# Patient Record
Sex: FEMALE | Race: ASIAN | NOT HISPANIC OR LATINO | URBAN - METROPOLITAN AREA
[De-identification: names, ages, dates, MRNs, and addresses within clinical notes are randomized per-mention and may not be internally consistent; named-entity substitution may affect disease eponyms.]

---

## 2018-01-29 ENCOUNTER — AMBULATORY - HEALTHEAST (OUTPATIENT)
Dept: NURSING | Facility: CLINIC | Age: 28
End: 2018-01-29

## 2018-09-28 ENCOUNTER — AMBULATORY - HEALTHEAST (OUTPATIENT)
Dept: NURSING | Facility: CLINIC | Age: 28
End: 2018-09-28

## 2019-01-21 ENCOUNTER — OFFICE VISIT - HEALTHEAST (OUTPATIENT)
Dept: FAMILY MEDICINE | Facility: CLINIC | Age: 29
End: 2019-01-21

## 2019-01-21 DIAGNOSIS — Z30.46 NEXPLANON REMOVAL: ICD-10-CM

## 2019-01-21 DIAGNOSIS — Z31.9 PROCREATIVE MANAGEMENT: ICD-10-CM

## 2019-01-21 ASSESSMENT — MIFFLIN-ST. JEOR: SCORE: 1143.4

## 2019-02-05 ENCOUNTER — COMMUNICATION - HEALTHEAST (OUTPATIENT)
Dept: FAMILY MEDICINE | Facility: CLINIC | Age: 29
End: 2019-02-05

## 2019-02-05 DIAGNOSIS — Z30.016 ENCOUNTER FOR INITIAL PRESCRIPTION OF TRANSDERMAL PATCH HORMONAL CONTRACEPTIVE DEVICE: ICD-10-CM

## 2019-03-13 ENCOUNTER — COMMUNICATION - HEALTHEAST (OUTPATIENT)
Dept: FAMILY MEDICINE | Facility: CLINIC | Age: 29
End: 2019-03-13

## 2019-03-22 ENCOUNTER — PRENATAL OFFICE VISIT - HEALTHEAST (OUTPATIENT)
Dept: FAMILY MEDICINE | Facility: CLINIC | Age: 29
End: 2019-03-22

## 2019-03-22 DIAGNOSIS — Z34.91 NORMAL PREGNANCY IN FIRST TRIMESTER: ICD-10-CM

## 2019-03-22 DIAGNOSIS — Z32.01 PREGNANCY TEST POSITIVE: ICD-10-CM

## 2019-03-22 DIAGNOSIS — N92.6 ABNORMAL MENSES: ICD-10-CM

## 2019-03-22 LAB
ALBUMIN UR-MCNC: NEGATIVE MG/DL
AMPHETAMINES UR QL SCN: NORMAL
BARBITURATES UR QL: NORMAL
BASOPHILS # BLD AUTO: 0 THOU/UL (ref 0–0.2)
BASOPHILS NFR BLD AUTO: 0 % (ref 0–2)
BENZODIAZ UR QL: NORMAL
CANNABINOIDS UR QL SCN: NORMAL
COCAINE UR QL: NORMAL
CREAT UR-MCNC: 89.8 MG/DL
EOSINOPHIL # BLD AUTO: 0 THOU/UL (ref 0–0.4)
EOSINOPHIL NFR BLD AUTO: 1 % (ref 0–6)
ERYTHROCYTE [DISTWIDTH] IN BLOOD BY AUTOMATED COUNT: 12 % (ref 11–14.5)
GLUCOSE UR STRIP-MCNC: NEGATIVE MG/DL
HCG UR QL: POSITIVE
HCT VFR BLD AUTO: 38.1 % (ref 35–47)
HGB BLD-MCNC: 12.3 G/DL (ref 12–16)
HIV 1+2 AB+HIV1 P24 AG SERPL QL IA: NEGATIVE
KETONES UR STRIP-MCNC: NEGATIVE MG/DL
LYMPHOCYTES # BLD AUTO: 1.8 THOU/UL (ref 0.8–4.4)
LYMPHOCYTES NFR BLD AUTO: 28 % (ref 20–40)
MCH RBC QN AUTO: 29.4 PG (ref 27–34)
MCHC RBC AUTO-ENTMCNC: 32.3 G/DL (ref 32–36)
MCV RBC AUTO: 91 FL (ref 80–100)
MONOCYTES # BLD AUTO: 0.4 THOU/UL (ref 0–0.9)
MONOCYTES NFR BLD AUTO: 6 % (ref 2–10)
NEUTROPHILS # BLD AUTO: 4.2 THOU/UL (ref 2–7.7)
NEUTROPHILS NFR BLD AUTO: 65 % (ref 50–70)
OPIATES UR QL SCN: NORMAL
OXYCODONE UR QL: NORMAL
PCP UR QL SCN: NORMAL
PLATELET # BLD AUTO: 224 THOU/UL (ref 140–440)
PMV BLD AUTO: 10.4 FL (ref 8.5–12.5)
RBC # BLD AUTO: 4.18 MILL/UL (ref 3.8–5.4)
WBC: 6.4 THOU/UL (ref 4–11)

## 2019-03-23 LAB
ANTIBODY SCREEN: NEGATIVE
BACTERIA SPEC CULT: NO GROWTH
COLLECTION METHOD: NORMAL
HBV SURFACE AG SERPL QL IA: NEGATIVE
LEAD BLD-MCNC: NORMAL UG/DL
LEAD RETEST: NO
T PALLIDUM AB SER QL: NEGATIVE

## 2019-03-25 LAB
ABO/RH(D): NORMAL
ABORH REPEAT: NORMAL
LEAD BLDV-MCNC: <2 UG/DL (ref 0–4.9)
RUBV IGG SERPL QL IA: POSITIVE

## 2019-04-02 ENCOUNTER — PRENATAL OFFICE VISIT - HEALTHEAST (OUTPATIENT)
Dept: FAMILY MEDICINE | Facility: CLINIC | Age: 29
End: 2019-04-02

## 2019-04-02 DIAGNOSIS — Z34.81 ENCOUNTER FOR SUPERVISION OF OTHER NORMAL PREGNANCY IN FIRST TRIMESTER: ICD-10-CM

## 2019-04-02 DIAGNOSIS — O21.9 NAUSEA AND VOMITING IN PREGNANCY: ICD-10-CM

## 2019-04-02 DIAGNOSIS — D50.8 OTHER IRON DEFICIENCY ANEMIA: ICD-10-CM

## 2019-04-02 DIAGNOSIS — K59.00 CONSTIPATION, UNSPECIFIED CONSTIPATION TYPE: ICD-10-CM

## 2019-04-02 DIAGNOSIS — E55.9 VITAMIN D DEFICIENCY: ICD-10-CM

## 2019-04-02 LAB
ALBUMIN UR-MCNC: NEGATIVE MG/DL
GLUCOSE UR STRIP-MCNC: NEGATIVE MG/DL
KETONES UR STRIP-MCNC: ABNORMAL MG/DL

## 2019-04-03 LAB
C TRACH DNA SPEC QL PROBE+SIG AMP: NEGATIVE
N GONORRHOEA DNA SPEC QL NAA+PROBE: NEGATIVE

## 2019-04-12 ENCOUNTER — HOSPITAL ENCOUNTER (OUTPATIENT)
Dept: ULTRASOUND IMAGING | Facility: HOSPITAL | Age: 29
Discharge: HOME | End: 2019-04-12
Attending: PHYSICIAN ASSISTANT

## 2019-04-12 DIAGNOSIS — Z34.91 NORMAL PREGNANCY IN FIRST TRIMESTER: ICD-10-CM

## 2019-04-12 DIAGNOSIS — Z32.01 PREGNANCY TEST POSITIVE: ICD-10-CM

## 2019-04-13 ENCOUNTER — COMMUNICATION - HEALTHEAST (OUTPATIENT)
Dept: FAMILY MEDICINE | Facility: CLINIC | Age: 29
End: 2019-04-13

## 2019-04-13 DIAGNOSIS — O21.9 NAUSEA AND VOMITING IN PREGNANCY: ICD-10-CM

## 2019-04-19 ENCOUNTER — PRENATAL OFFICE VISIT - HEALTHEAST (OUTPATIENT)
Dept: FAMILY MEDICINE | Facility: CLINIC | Age: 29
End: 2019-04-19

## 2019-04-19 DIAGNOSIS — Z12.4 CERVICAL CANCER SCREENING: ICD-10-CM

## 2019-04-19 DIAGNOSIS — Z34.81 ENCOUNTER FOR SUPERVISION OF OTHER NORMAL PREGNANCY IN FIRST TRIMESTER: ICD-10-CM

## 2019-04-19 DIAGNOSIS — L70.0 ACNE VULGARIS: ICD-10-CM

## 2019-04-19 LAB
ALBUMIN UR-MCNC: NEGATIVE MG/DL
GLUCOSE UR STRIP-MCNC: NEGATIVE MG/DL
KETONES UR STRIP-MCNC: NEGATIVE MG/DL

## 2019-04-23 LAB
HPV SOURCE: NORMAL
HUMAN PAPILLOMA VIRUS 16 DNA: NEGATIVE
HUMAN PAPILLOMA VIRUS 18 DNA: NEGATIVE
HUMAN PAPILLOMA VIRUS FINAL DIAGNOSIS: NORMAL
HUMAN PAPILLOMA VIRUS OTHER HR: NEGATIVE
SPECIMEN DESCRIPTION: NORMAL

## 2019-05-01 ENCOUNTER — COMMUNICATION - HEALTHEAST (OUTPATIENT)
Dept: FAMILY MEDICINE | Facility: CLINIC | Age: 29
End: 2019-05-01

## 2019-05-02 ENCOUNTER — COMMUNICATION - HEALTHEAST (OUTPATIENT)
Dept: FAMILY MEDICINE | Facility: CLINIC | Age: 29
End: 2019-05-02

## 2019-05-06 ENCOUNTER — PRENATAL OFFICE VISIT - HEALTHEAST (OUTPATIENT)
Dept: FAMILY MEDICINE | Facility: CLINIC | Age: 29
End: 2019-05-06

## 2019-05-06 DIAGNOSIS — Z34.82 ENCOUNTER FOR SUPERVISION OF OTHER NORMAL PREGNANCY IN SECOND TRIMESTER: ICD-10-CM

## 2019-05-06 DIAGNOSIS — K59.00 CONSTIPATION, UNSPECIFIED CONSTIPATION TYPE: ICD-10-CM

## 2019-05-06 DIAGNOSIS — O21.0 HYPEREMESIS GRAVIDARUM: ICD-10-CM

## 2019-05-06 LAB
ALBUMIN UR-MCNC: NEGATIVE MG/DL
ANION GAP SERPL CALCULATED.3IONS-SCNC: 12 MMOL/L (ref 5–18)
BUN SERPL-MCNC: 9 MG/DL (ref 8–22)
CALCIUM SERPL-MCNC: 9.7 MG/DL (ref 8.5–10.5)
CHLORIDE BLD-SCNC: 104 MMOL/L (ref 98–107)
CO2 SERPL-SCNC: 22 MMOL/L (ref 22–31)
CREAT SERPL-MCNC: 0.59 MG/DL (ref 0.6–1.1)
GFR SERPL CREATININE-BSD FRML MDRD: >60 ML/MIN/1.73M2
GLUCOSE BLD-MCNC: 61 MG/DL (ref 70–125)
GLUCOSE UR STRIP-MCNC: NEGATIVE MG/DL
KETONES UR STRIP-MCNC: ABNORMAL MG/DL
POTASSIUM BLD-SCNC: 3.6 MMOL/L (ref 3.5–5)
SODIUM SERPL-SCNC: 138 MMOL/L (ref 136–145)
TSH SERPL DL<=0.005 MIU/L-ACNC: 0.32 UIU/ML (ref 0.3–5)

## 2019-05-10 ENCOUNTER — PRENATAL OFFICE VISIT - HEALTHEAST (OUTPATIENT)
Dept: FAMILY MEDICINE | Facility: CLINIC | Age: 29
End: 2019-05-10

## 2019-05-10 DIAGNOSIS — Z34.82 ENCOUNTER FOR SUPERVISION OF OTHER NORMAL PREGNANCY IN SECOND TRIMESTER: ICD-10-CM

## 2019-05-17 ENCOUNTER — COMMUNICATION - HEALTHEAST (OUTPATIENT)
Dept: FAMILY MEDICINE | Facility: CLINIC | Age: 29
End: 2019-05-17

## 2019-05-24 ENCOUNTER — PRENATAL OFFICE VISIT - HEALTHEAST (OUTPATIENT)
Dept: FAMILY MEDICINE | Facility: CLINIC | Age: 29
End: 2019-05-24

## 2019-05-24 DIAGNOSIS — O21.0 HYPEREMESIS GRAVIDARUM: ICD-10-CM

## 2019-05-24 DIAGNOSIS — Z34.82 ENCOUNTER FOR SUPERVISION OF OTHER NORMAL PREGNANCY IN SECOND TRIMESTER: ICD-10-CM

## 2019-05-24 ASSESSMENT — MIFFLIN-ST. JEOR: SCORE: 1151.86

## 2019-07-02 ENCOUNTER — PRENATAL OFFICE VISIT - HEALTHEAST (OUTPATIENT)
Dept: FAMILY MEDICINE | Facility: CLINIC | Age: 29
End: 2019-07-02

## 2019-07-02 DIAGNOSIS — Z34.92 NORMAL PREGNANCY IN SECOND TRIMESTER: ICD-10-CM

## 2019-07-02 DIAGNOSIS — O21.0 HYPEREMESIS GRAVIDARUM: ICD-10-CM

## 2019-07-02 DIAGNOSIS — Z34.82 ENCOUNTER FOR SUPERVISION OF OTHER NORMAL PREGNANCY IN SECOND TRIMESTER: ICD-10-CM

## 2019-07-02 LAB
ALBUMIN UR-MCNC: ABNORMAL MG/DL
GLUCOSE UR STRIP-MCNC: NEGATIVE MG/DL
KETONES UR STRIP-MCNC: NEGATIVE MG/DL

## 2019-07-03 ENCOUNTER — COMMUNICATION - HEALTHEAST (OUTPATIENT)
Dept: FAMILY MEDICINE | Facility: CLINIC | Age: 29
End: 2019-07-03

## 2019-07-05 ENCOUNTER — HOSPITAL ENCOUNTER (OUTPATIENT)
Dept: ULTRASOUND IMAGING | Facility: HOSPITAL | Age: 29
Discharge: HOME | End: 2019-07-05
Attending: FAMILY MEDICINE

## 2019-07-05 DIAGNOSIS — Z34.82 ENCOUNTER FOR SUPERVISION OF OTHER NORMAL PREGNANCY IN SECOND TRIMESTER: ICD-10-CM

## 2019-07-05 LAB
# FETUSES US: NORMAL
AFP MOM SERPL: 1.06
AFP SERPL-MCNC: 75 NG/ML
AGE - REPORTED: 29.9 YR
CURRENT SMOKER: NO
FAMILY MEMBER DISEASES HX: NO
GA METHOD: NORMAL
GA: NORMAL WK
HCG MOM SERPL: 0.37
HCG SERPL-ACNC: 8728 IU/L
HX OF HEREDITARY DISORDERS: NO
IDDM PATIENT QL: NO
INHIBIN A MOM SERPL: 0.64
INHIBIN A SERPL-MCNC: 130 PG/ML
INTEGRATED SCN PATIENT-IMP: NORMAL
PATHOLOGY STUDY: NORMAL
SPECIMEN DRAWN SERPL: NORMAL
U ESTRIOL MOM SERPL: 1.19
U ESTRIOL SERPL-MCNC: 2.88 NG/ML

## 2019-07-08 ENCOUNTER — COMMUNICATION - HEALTHEAST (OUTPATIENT)
Dept: FAMILY MEDICINE | Facility: CLINIC | Age: 29
End: 2019-07-08

## 2019-07-10 ENCOUNTER — COMMUNICATION - HEALTHEAST (OUTPATIENT)
Dept: FAMILY MEDICINE | Facility: CLINIC | Age: 29
End: 2019-07-10

## 2019-07-16 ENCOUNTER — PRENATAL OFFICE VISIT - HEALTHEAST (OUTPATIENT)
Dept: FAMILY MEDICINE | Facility: CLINIC | Age: 29
End: 2019-07-16

## 2019-07-16 DIAGNOSIS — Z34.82 ENCOUNTER FOR SUPERVISION OF OTHER NORMAL PREGNANCY IN SECOND TRIMESTER: ICD-10-CM

## 2019-07-16 DIAGNOSIS — Z34.92 NORMAL PREGNANCY IN SECOND TRIMESTER: ICD-10-CM

## 2019-07-16 DIAGNOSIS — O21.0 HYPEREMESIS GRAVIDARUM: ICD-10-CM

## 2019-07-25 ENCOUNTER — RECORDS - HEALTHEAST (OUTPATIENT)
Dept: ADMINISTRATIVE | Facility: OTHER | Age: 29
End: 2019-07-25

## 2019-07-25 ENCOUNTER — AMBULATORY - HEALTHEAST (OUTPATIENT)
Dept: LAB | Facility: CLINIC | Age: 29
End: 2019-07-25

## 2019-07-30 ENCOUNTER — COMMUNICATION - HEALTHEAST (OUTPATIENT)
Dept: FAMILY MEDICINE | Facility: CLINIC | Age: 29
End: 2019-07-30

## 2019-07-31 ENCOUNTER — COMMUNICATION - HEALTHEAST (OUTPATIENT)
Dept: FAMILY MEDICINE | Facility: CLINIC | Age: 29
End: 2019-07-31

## 2019-08-30 ENCOUNTER — PRENATAL OFFICE VISIT - HEALTHEAST (OUTPATIENT)
Dept: FAMILY MEDICINE | Facility: CLINIC | Age: 29
End: 2019-08-30

## 2019-08-30 DIAGNOSIS — Z34.82 ENCOUNTER FOR SUPERVISION OF OTHER NORMAL PREGNANCY IN SECOND TRIMESTER: ICD-10-CM

## 2019-08-30 DIAGNOSIS — Z34.83 ENCOUNTER FOR SUPERVISION OF OTHER NORMAL PREGNANCY IN THIRD TRIMESTER: ICD-10-CM

## 2019-08-30 LAB
ALBUMIN UR-MCNC: NEGATIVE MG/DL
FASTING STATUS PATIENT QL REPORTED: NO
GLUCOSE 1H P 50 G GLC PO SERPL-MCNC: 75 MG/DL (ref 70–139)
GLUCOSE UR STRIP-MCNC: NEGATIVE MG/DL
HGB BLD-MCNC: 13.2 G/DL (ref 12–16)
KETONES UR STRIP-MCNC: NEGATIVE MG/DL

## 2019-08-30 ASSESSMENT — MIFFLIN-ST. JEOR: SCORE: 1214.84

## 2019-08-31 LAB — T PALLIDUM AB SER QL: NEGATIVE

## 2019-09-27 ENCOUNTER — PRENATAL OFFICE VISIT - HEALTHEAST (OUTPATIENT)
Dept: FAMILY MEDICINE | Facility: CLINIC | Age: 29
End: 2019-09-27

## 2019-09-27 DIAGNOSIS — Z34.83 ENCOUNTER FOR SUPERVISION OF OTHER NORMAL PREGNANCY IN THIRD TRIMESTER: ICD-10-CM

## 2019-10-11 ENCOUNTER — PRENATAL OFFICE VISIT - HEALTHEAST (OUTPATIENT)
Dept: FAMILY MEDICINE | Facility: CLINIC | Age: 29
End: 2019-10-11

## 2019-10-11 DIAGNOSIS — Z34.83 ENCOUNTER FOR SUPERVISION OF OTHER NORMAL PREGNANCY IN THIRD TRIMESTER: ICD-10-CM

## 2019-10-11 DIAGNOSIS — Z34.92 NORMAL PREGNANCY IN SECOND TRIMESTER: ICD-10-CM

## 2019-10-11 ASSESSMENT — MIFFLIN-ST. JEOR: SCORE: 1238.09

## 2019-10-22 ENCOUNTER — OFFICE VISIT - HEALTHEAST (OUTPATIENT)
Dept: FAMILY MEDICINE | Facility: CLINIC | Age: 29
End: 2019-10-22

## 2019-10-22 DIAGNOSIS — Z34.83 ENCOUNTER FOR SUPERVISION OF OTHER NORMAL PREGNANCY IN THIRD TRIMESTER: ICD-10-CM

## 2019-10-22 ASSESSMENT — MIFFLIN-ST. JEOR: SCORE: 1251.13

## 2019-10-24 LAB
ALLERGIC TO PENICILLIN: NO
GP B STREP DNA SPEC QL NAA+PROBE: NEGATIVE

## 2019-10-29 ENCOUNTER — PRENATAL OFFICE VISIT - HEALTHEAST (OUTPATIENT)
Dept: FAMILY MEDICINE | Facility: CLINIC | Age: 29
End: 2019-10-29

## 2019-10-29 DIAGNOSIS — Z34.83 ENCOUNTER FOR SUPERVISION OF OTHER NORMAL PREGNANCY IN THIRD TRIMESTER: ICD-10-CM

## 2019-10-29 ASSESSMENT — MIFFLIN-ST. JEOR: SCORE: 1255.66

## 2019-11-08 ENCOUNTER — PRENATAL OFFICE VISIT - HEALTHEAST (OUTPATIENT)
Dept: FAMILY MEDICINE | Facility: CLINIC | Age: 29
End: 2019-11-08

## 2019-11-08 DIAGNOSIS — Z34.83 ENCOUNTER FOR SUPERVISION OF OTHER NORMAL PREGNANCY IN THIRD TRIMESTER: ICD-10-CM

## 2019-11-08 DIAGNOSIS — R25.2 LEG CRAMPS IN PREGNANCY: ICD-10-CM

## 2019-11-08 DIAGNOSIS — O99.891 LEG CRAMPS IN PREGNANCY: ICD-10-CM

## 2019-11-08 ASSESSMENT — MIFFLIN-ST. JEOR: SCORE: 1264.74

## 2019-11-12 ENCOUNTER — PRENATAL OFFICE VISIT - HEALTHEAST (OUTPATIENT)
Dept: FAMILY MEDICINE | Facility: CLINIC | Age: 29
End: 2019-11-12

## 2019-11-12 DIAGNOSIS — Z34.83 ENCOUNTER FOR SUPERVISION OF OTHER NORMAL PREGNANCY IN THIRD TRIMESTER: ICD-10-CM

## 2019-11-12 ASSESSMENT — MIFFLIN-ST. JEOR: SCORE: 1264.74

## 2019-11-22 ENCOUNTER — AMBULATORY - HEALTHEAST (OUTPATIENT)
Dept: FAMILY MEDICINE | Facility: CLINIC | Age: 29
End: 2019-11-22

## 2019-11-22 DIAGNOSIS — D50.8 IRON DEFICIENCY ANEMIA SECONDARY TO INADEQUATE DIETARY IRON INTAKE: ICD-10-CM

## 2019-12-27 ENCOUNTER — OFFICE VISIT - HEALTHEAST (OUTPATIENT)
Dept: FAMILY MEDICINE | Facility: CLINIC | Age: 29
End: 2019-12-27

## 2019-12-27 DIAGNOSIS — D50.8 IRON DEFICIENCY ANEMIA SECONDARY TO INADEQUATE DIETARY IRON INTAKE: ICD-10-CM

## 2019-12-27 ASSESSMENT — MIFFLIN-ST. JEOR: SCORE: 1175.71

## 2019-12-27 ASSESSMENT — EDINBURGH POSTNATAL DEPRESSION SCALE (EPDS): TOTAL SCORE: 2

## 2020-01-06 ENCOUNTER — COMMUNICATION - HEALTHEAST (OUTPATIENT)
Dept: FAMILY MEDICINE | Facility: CLINIC | Age: 30
End: 2020-01-06

## 2020-01-27 ENCOUNTER — OFFICE VISIT - HEALTHEAST (OUTPATIENT)
Dept: FAMILY MEDICINE | Facility: CLINIC | Age: 30
End: 2020-01-27

## 2020-01-27 DIAGNOSIS — Z30.017 NEXPLANON INSERTION: ICD-10-CM

## 2020-01-27 LAB — HCG UR QL: NEGATIVE

## 2020-01-27 ASSESSMENT — MIFFLIN-ST. JEOR: SCORE: 1179.12

## 2021-05-26 NOTE — PROGRESS NOTES
Chief Complaint:  Chief Complaint   Patient presents with     Initial Prenatal Visit     4th preg     HPI:   Shelia Luke is a 29 y.o. female is here for pregnancy intake.  She is .  Patient's last menstrual period was 2019 (exact date).  Nexplanon removed by myself on 19.  Positive home pregnancy test on 3/11/19.  She is having some mild nausea, mild dizziness.  Overall she is doing well.    Past medical history: reviewed and updated.  Past Medical History:   Diagnosis Date     Urinary tract infection      No past surgical history on file.    Current Outpatient Medications:      acetaminophen (TYLENOL) 325 MG tablet, Take 325-650 mg by mouth every 6 (six) hours as needed for pain or fever., Disp: , Rfl:      condoms - male Misc, Use as directed, Disp: 24 each, Rfl: 0     prenatal vit-iron fum-folic ac (PRENATAL VITAMIN) 27 mg iron- 0.8 mg Tab, Take 1 tablet by mouth once daily., Disp: 100 tablet, Rfl: 3  No current facility-administered medications for this visit.     Social:  Social History     Tobacco Use     Smoking status: Never Smoker     Smokeless tobacco: Never Used   Substance Use Topics     Alcohol use: No     Frequency: Never     Drug use: No       OBJECTIVE:  No Known Allergies  Vitals:    19 1525   BP: (!) 86/58   Pulse: 80   Resp: 16     Body mass index is 21.11 kg/m .    Vital signs stable as recorded above  General: Patient is alert and oriented x 3, in no apparent distress  Remainder of physical exam deferred to patient's first OB visit.  Results:  Results for orders placed or performed in visit on 19   Pregnancy, Urine   Result Value Ref Range    Pregnancy Test, Urine Positive (!) Negative     Other screening pregnancy lab work is ordered and pending.    Patient scored a 1/30 on [place]  Depression Screen.    Assessment and Plan:  1. Pregnancy Intake Appointment.  Shelia Luke is 29 y.o. and .  Patient's last menstrual period was 2019 (exact  date).  Estimated Date of Delivery: 11/16/19  She will be seeing Dr. Daniels for OB care.  Screening pregnancy lab work was drawn.  Prenatal vitamins prescribed.  Problem list and current medications reviewed and updated.  Dating ultrasound ordered.  Prenatal info packet given.    Follow up in 2-4 weeks.  Please see OB Episode for complete details.  Visit was approximately 40 minutes, I spent greater than 50% of time spent in face-to-face counseling and coordination of care.    This dictation uses voice recognition software, which may contain typographical errors.

## 2021-05-26 NOTE — PATIENT INSTRUCTIONS - HE
Pregnancy: 5-6 weeks    Due Date: November 16, 2019    Next visit in 2-4 weeks  With Dr. Daniels  For Your First OB Visit

## 2021-05-27 NOTE — TELEPHONE ENCOUNTER
Fax received from [facility] pharmacy requesting Prior Authorization    Medication Name: Pyridoxine 25mg tablets     Insurance Plan:    [facility] Phone Number: [phone]   Patient ID Number: [id number]    Please start PA process

## 2021-05-27 NOTE — PROGRESS NOTES
Subjective:    Nausea is bad.  Not currently taking anything for it.  Vomiting once a day in the morning.  Constant nausea.  Not able to eat much  Feels dizzy.  Missing work, worried about her job - [employer] [place] [employer]    OB ROS:  Abnormal vaginal discharge: None.  Bleeding: None.    Objective:  Reviewed vitals. Exam: see flowsheet.   Recent Results (from the past 24 hour(s))   Urinalysis, OB Screen   Result Value Ref Range    Glucose, UA Negative Negative    Ketones, UA Trace (!) Negative    Protein, UA Negative Negative mg/dL      Assessment/Plan:    29 y.o.  at 7w3d.    Nausea and vomiting of pregnancy.  Weight down 2 pounds.  Ketones in urine.  Work note written.  Prescription sent in for doxylamine and pyridoxine 3 times daily.  Discontinue prenatal and use folic acid 800 mcg until nausea subsides.  Follow-up in 1 week.    Order Summary                                                      1. Encounter for supervision of other normal pregnancy in first trimester  Urinalysis, OB Screen    Chlamydia trachomatis & Neisseria gonorrhoeae, Amplified Detection    folic acid (FOLVITE) 800 MCG tablet   2. Constipation, unspecified constipation type     3. Vitamin D deficiency     4. Other iron deficiency anemia     5. Nausea and vomiting in pregnancy  doxylamine (UNISOM, DOXYLAMINE,) 25 mg tablet    pyridoxine, vitamin B6, (VITAMIN B-6) 25 MG tablet      Completed by: Wilma Daniels M.D., [facility]. 2019 3:48 PM.  Total time: 15 minutes. Greater than 50% spent in counseling and coordination of care regarding topics, above.

## 2021-05-27 NOTE — TELEPHONE ENCOUNTER
Central PA team  [phone]  Pool: HE PA MED ([id number])          PA has been initiated.       PA form completed and faxed insurance via Cover My Meds     Key:  [id number]     Medication:  VITAMIN B-12 25MG    Insurance:  [employer]        Response will be received via fax and may take up to 5-10 business days depending on plan

## 2021-05-28 NOTE — PROGRESS NOTES
Subjective:    Feeling a little better, but still having significant nausea. They are helping.  Eating: more. But still having a hard time keeping some things down.  Drinking: able to keep down, better  Vomiting: once a day - has improved the most  Headache: still there  Energy: really low    Current Outpatient Medications   Medication Sig Dispense Refill     acetaminophen (TYLENOL) 325 MG tablet Take 325-650 mg by mouth every 6 (six) hours as needed for pain or fever.       docusate sodium (COLACE) 100 MG capsule Take 1 capsule (100 mg total) by mouth 2 (two) times a day as needed for constipation. 50 capsule 1     folic acid (FOLVITE) 800 MCG tablet Take 1 tablet (800 mcg total) by mouth daily. Resume prenatal vitamin and stop folic acid when nausea is gone. 30 tablet 1     meclizine (ANTIVERT) 25 mg tablet Take 1 tablet (25 mg total) by mouth every 6 (six) hours. 50 tablet 3     No current facility-administered medications for this visit.          OB ROS:   Headache: Yes. see above.   Vision change: None.   Abnormal vaginal discharge: None.   Bleeding: None.   Fetal movement: None.     Objective:  Reviewed vitals. Exam - see flowsheet.   Recent Results (from the past 24 hour(s))   Urinalysis, OB Screen (ketones, glucose, protein)   Result Value Ref Range    Glucose, UA Negative Negative    Ketones, UA Negative Negative    Protein, UA Negative Negative mg/dL      Assessment/Plan:    29 y.o.  at 12w6d.    Nausea and vomiting of pregnancy. Weight still down 3#. Patient happy with current antinausea treatment. Continue same. REcheck in two weeks.    Order Summary                                                      1. Encounter for supervision of other normal pregnancy in second trimester  Urinalysis, OB Screen (ketones, glucose, protein)      Completed by: Wilma Daniels M.D., [facility]. 5/10/2019 4:46 PM.  Total time: 15 minutes. Greater than 50% spent in counseling and coordination of care  regarding topics, above.

## 2021-05-28 NOTE — PATIENT INSTRUCTIONS - HE
Patient Education   HEALTHY PREGNANCY CARE: 10-14 WEEKS PREGNANT     By weeks 10 to 14 of your pregnancy, the placenta has formed inside your uterus. It may be possible to hear your baby's heartbeat with a doppler ultrasound device. Your baby's eyes can and do move. The arms and legs can bend.    The second trimester genetic screening tests for Down's Syndrome, Trisomy 18, and neural tube defects (which are known collectively as a quad screen) are done at 15 to 22 weeks. It's your choice whether to have these tests. You and your partner can talk to your midwife or physician about birth defects tests.    Consider breastfeeding for the healthiest way to feed your baby. Ask your midwife or physician for more information.     As your center of gravity and weight changes, use good body mechanics when changing positions and lifting. For example, use a straight back and your legs for support when lifting instead of bending over. Maintain good posture to prevent straining your muscles. Now is a good time to continue or restart your exercise program. Walking 30-60 minutes daily is an excellent way to keep fit. Yoga and swimming also offer many benefits.    The nausea and fatigue of early pregnancy have usually started to let up, so this is a good time to focus on nutrition. Consider attending a nutrition class. A healthy diet includes about 60 grams of protein each day (3-4 servings of dairy, 2-3 servings of meat/fish/poultry/nuts), 4-6 servings of whole grain foods, and 5-6 servings of fruits and vegetables. Remember to drink 6-8 glasses of water daily.    Watch for warning signs, such as     vaginal bleeding    fluid leaking from your vagina    severe abdominal pain    nausea and vomiting more than 4-5 times a day, or if you are unable to keep anything down    fever more than 100.4 degrees F.     Contact your midwife or physician at [facility]/OB at Phone: [phone] if you have these or any  other concerns. If it's after clinic hours, physician patients should call the Care Connection at [phone] ([phone]); midwife patients should call their answering service at [phone].    How can you care for yourself at home?   You can refer to the Starting Out Right book or find it online at [email or web address]/images/stories/maternity/[facility]-Starting-Out-Right.pdf or http://www.[facility].org/images/stories/flipbooks/[facility]-starting-out-right/[facility]-starting-out-right.html#p=8  You can sign up for a weekly parenting e-mail that gives support, tips and advice from health care professionals that starts with pregnancy and continues through the toddler years. To register, go to [email or web address] at any time during your pregnancy.      Breastfeeding: a Healthy Option for You and Your Baby    The choice of how you will feed your baby is important.  Before your baby s birth, you ll want to learn about the benefits of breastfeeding.  [facility] have been designated Baby Friendly; an initiative that was created by the World Health Organization and UNICEF.  This helps give you and your baby the best start in feeding their baby.    Why should I breastfeed my baby?    Babies are less likely to develop childhood obesity or diabetes    Babies are less likely to suffer from recurrent ear infections    Babies are less likely to be hospitalized for respiratory conditions    Breast milk is rich in nutrients and antibodies-it is easy to digest    How does it benefit me?    Lowers the risk for diabetes, breast and ovarian cancer and postpartum depression    Moms can lose  baby weight  more quickly    Cost savings - formula can cost well over $1,500 per year    Convenient - no bottles and nipples to sterilize, no measuring and mixing formula    The physical contact with breastfeeding can make babies feel secure, warm and comforted     What about formula?  While you and your baby are staying with  us at [facility], we will support whatever feeding choice you make for your baby.    Some important considerations:      The American Academy of Pediatrics, the World Health Organization, and many more organizations recommend exclusive breastfeeding for 6 months and continued breastfeeding while adding other foods for the first 1-2 years.      Any amount of breastmilk has benefits to both baby and mother.    Giving formula in replacement of breastfeeding can affect mother s milk supply.  If formula is needed, hospital staff will work with you on a plan to help develop your milk supply.    Formula alters the natural growth of good bacteria in the  stomach.     Research has found that first time mothers who offer formula in the hospital have a shorter duration of breastfeeding.    How can I start to prepare?     Start by having a conversation with your medical provider.     Talk with your partner, family and friends.     Attend a prenatal class that includes breastfeeding preparation. Birth and breastfeeding classes are offered by [facility]. Visit [employer] for class information.     After your baby s birth, hospital staff and lactation consultants will help you and your baby get off to a great start with breastfeeding.    Resources:      [facility] clinic and hospital staff will support you throughout your pregnancy, delivery and beyond.  Visit [email or web address] for more details.        A free weekly pregnancy and parenting email is offered by [facility]. Emails include week by week information about your pregnancy, baby s development, breastfeeding and beyond.  Sign up at [email or web address].    [facility] [phone].  Consultants are available for assessment of your breastfeeding concerns, support and education.    La Leche League helps mothers worldwide to breastfeed through mother-to-mother support, encouragement, information and  education. [facility] promotes breastfeeding as an important element in the healthy development of baby and mother. Monthly classes, support groups and telephone help are offered in your community. To learn more visit [email or web address].    [facility] Care Connection: [phone] ([phone])

## 2021-05-28 NOTE — PATIENT INSTRUCTIONS - HE
Patient Education   HEALTHY PREGNANCY CARE: 6 to 10 WEEKS PREGNANT    Pregnancy is an important time for you to take care of yourself and your baby. There is much that you can do through simple things like nutrition and exercise that will help you achieve the best outcome possible.     Learn about the changes you and your baby will experience during pregnancy. Your baby's facial features, brain, spinal cord and internal organs are developing, and baby's heart is pumping blood. Due to hormonal changes, you may notice nausea, fatigue or breast tenderness.    Common Discomforts of Early Pregnancy  Your body goes through many changes during pregnancy. Some are noticeable like increased breast size or darkening of the color of the nipple, but some changes may cause discomfort like breast tenderness, urinary frequency, fatigue or nausea. If you have questions about the duration or severity of what you are experiencing, contact your midwife or physician for guidance.     Coping with nausea/morning sickness    Sip small amounts of water, juices, or shakes. Try drinking between meals, not with meals.     Eat 5 or 6 small meals a day. Try dry toast, crackers, or cereal when you first get up, and eat breakfast a little later.    Make nora tea (sliced nora root in hot water with honey). Sip a cup in the morning before getting up.    Avoid spicy, greasy, and fatty foods.     When you feel sick, open your windows or go for a short walk to get fresh air.     Try nausea wristbands. These help some women.     Call your midwife or physician if you cannot keep fluids down, or if vomiting persists. There are medications that can help.    Choose healthy foods and gain the recommended amount of weight for your size. If you have questions or follow a special diet, talk with your midwife or physician. You should take one prenatal vitamin daily.  If nausea is a problem, try taking only a folic acid supplement of 400-800mcg daily until  the nausea passes.    Follow safe guidelines for exercise. Low impact aerobic activities are generally okay during pregnancy. If you have a regular exercise routine, you should be able to continue it during pregnancy as long as it doesn't cause pain. Talk to your midwife or physician about your activity at your prenatal visits.    You can sign up for a weekly parenting e-mail that gives support, tips and advice from health care professionals that starts with pregnancy and continues through the toddler years. To register, go to [email or web address] at any time during your pregnancy.    Things to Avoid During Pregnancy  A general principal to follow during pregnancy is to stay away from anything that is strong/bad smelling (gas, paint, fumes, etc), or known to cause problems for mom or baby    Smoking (self or others)    Alcohol     Pesticides    Caffeine     Soft cheeses    Fish with high mercury content (such as shark, swordfish, dav mackerel, or tilefish)    Some over the counter meds (ask your midwife or physician before taking)    Changing the shiva litter box    This is also a good time to think about genetic screening tests. These are tests done during pregnancy to look for possible problems with the baby. First trimester tests for Down's Syndrome, Trisomy 13 and 18 can be done as early as 10 weeks of pregnancy. Some testing can be done as late as 22 weeks of pregnancy, depending on the test. There are other tests that look for spinal defects, cystic fibrosis, Anibal-Sachs disease. Talk with your midwife or physician about testing.    Warning Signs    Watch for warning signs, such as     vaginal bleeding    fluid leaking from your vagina    severe abdominal pain    nausea and vomiting more than 4-5 times a day, or if you are unable to keep anything down    fever more than 100.4 degrees F.     Contact your midwife or physician at [facility] FAM[facility]INE/OB at Phone: [phone] if you have these or  any other concerns. If it's after clinic hours, physician patients should call the Care Connection at [phone] ([phone]); midwife patients should call their answering service at [phone].    How can you care for yourself at home?   You can refer to the Starting Out Right book or find it online at http://[email or web address].org/images/stories/maternity/[facility]-Starting-Out-Right.pdf or http://www.[facility].org/images/stories/flipbooks/[facility]-starting-out-right/[facility]-starting-out-right.html#p=8       Breastfeeding: a Healthy Option for You and Your Baby    The choice of how you will feed your baby is important.  Before your baby s birth, you ll want to learn about the benefits of breastfeeding.  [facility] have been designated Baby Friendly; an initiative that was created by the World Health Organization and UNICEF.  This helps give you and your baby the best start in feeding their baby.    Why should I breastfeed my baby?    Babies are less likely to develop childhood obesity or diabetes    Babies are less likely to suffer from recurrent ear infections    Babies are less likely to be hospitalized for respiratory conditions    Breast milk is rich in nutrients and antibodies-it is easy to digest    How does it benefit me?    Lowers the risk for diabetes, breast and ovarian cancer and postpartum depression    Moms can lose  baby weight  more quickly    Cost savings - formula can cost well over $1,500 per year    Convenient - no bottles and nipples to sterilize, no measuring and mixing formula    The physical contact with breastfeeding can make babies feel secure, warm and comforted     What about formula?  While you and your baby are staying with us at [facility], we will support whatever feeding choice you make for your baby.    Some important considerations:      The American Academy of Pediatrics, the World Health Organization, and many more organizations recommend exclusive breastfeeding for 6  months and continued breastfeeding while adding other foods for the first 1-2 years.      Any amount of breastmilk has benefits to both baby and mother.    Giving formula in replacement of breastfeeding can affect mother s milk supply.  If formula is needed, hospital staff will work with you on a plan to help develop your milk supply.    Formula alters the natural growth of good bacteria in the  stomach.     Research has found that first time mothers who offer formula in the hospital have a shorter duration of breastfeeding.    How can I start to prepare?     Start by having a conversation with your medical provider.     Talk with your partner, family and friends.     Attend a prenatal class that includes breastfeeding preparation. Birth and breastfeeding classes are offered by [facility]. Visit [employer] for class information.     After your baby s birth, hospital staff and lactation consultants will help you and your baby get off to a great start with breastfeeding.    Resources:      [facility] clinic and hospital staff will support you throughout your pregnancy, delivery and beyond.  Visit [email or web address] for more details.        A free weekly pregnancy and parenting email is offered by [facility]. Emails include week by week information about your pregnancy, baby s development, breastfeeding and beyond.  Sign up at [email or web address].    [facility] [phone].  Consultants are available for assessment of your breastfeeding concerns, support and education.    [employer] helps mothers worldwide to breastfeed through mother-to-mother support, encouragement, information and education. [employer] promotes breastfeeding as an important element in the healthy development of baby and mother. Monthly classes, support groups and telephone help are offered in your community. To learn more visit [email or web address].    [facility]  Connection: [phone] ([phone])

## 2021-05-28 NOTE — TELEPHONE ENCOUNTER
New Appointment Needed  What is the reason for the visit:    Same Date/Next Day Appt Request  What is the reason for your visit?: needs note re: ob problems with morning sickness from Primary Dr.    Provider Preference: PCP only  How soon do you need to be seen?: asap  Waitlist offered?: No  Okay to leave a detailed message:  No

## 2021-05-28 NOTE — TELEPHONE ENCOUNTER
Needs to be seen. No documentation of severe nausea and vomiting on her chart at this point. Just some tolerable nausea and vomiting that she wasn't even taking medications for.

## 2021-05-28 NOTE — PROGRESS NOTES
First OB Appointment    Assessment & Plan:  1. Subchorionic hemorrhage - has had a little brownish blood on TP  2. Declines trisomy screening and carrier screening for cystic fibrosis and spinal muscular atrophy.  3. Mild pustular acne.  Prescription sent for clindamycin.  Discussed the importance of weight over-the-counter medications.    Subjective:  This is a planned pregnancy. The patient feels happy about it. Current pregnancy symptoms include: Nausea and vomiting.  Insurance company is not covering her antiemetics.  At this point, she is just going to deal with that until things get better.    Had some light brown spotting on the toilet paper.  Patient with subchorionic hemorrhage.    I reviewed the following components of the patient chart today:    OB intake note    Active problems    Past Medical History    Medications    Allergies    Family History    Social History    Genetic Questionairre    Prenatal Labs    Prenatal Ultrasound    OB history  OB History    Para Term  AB Living   4 3 3 0 0 3   SAB TAB Ectopic Multiple Live Births   0 0 0 0 3      # Outcome Date GA Lbr Luan/2nd Weight Sex Delivery Anes PTL Lv   4 Current            3 Term 13 40w3d  5 lb 15.9 oz (2.72 kg) F Vag-Spont None N LANIE      Birth Comments: Pregnancy: anemia hgb 9.2, close pregnancies. . No pain meds. Pushed 24 minutes.  mL. Light mec. Declined IM pitocin.   2 Term 12 40w2d  5 lb 14 oz (2.665 kg) F Vag-Spont IV  LANIE      Birth Comments: Pregnancy: unplanned, GBS+, low placenta (resolved), oligohydramnios (ARDHA 4.8), insufficient care, close pregnancies. . PCNx1, IV fentanyl, pushed <10 min, EBL 150mL   1 Term 01/10/12 40w2d 20:00 / 02:00 6 lb 6.3 oz (2.9 kg) F Vag-Spont None N LANIE      Birth Comments: Pregnancy: left renal pelviectasis, borderline anemia, lead level 4.5, , EBL 200mL, no lacs      Obstetric Comments    - Sharron    - Jasmin    - Génesis       Objective:  See  prenatal flowsheet and physical exam portion of prenatal episode.    Total time 40 minutes, >50% spent in counseling and coordination of care regarding new pregnancy and review of patient's current health status and pregnancy.

## 2021-05-28 NOTE — TELEPHONE ENCOUNTER
----- Message from Jaime Schneider,  sent at 5/1/2019 10:21 AM CDT -----  Regarding: ob patient need a FLMA  Contact: [phone]  Patient called today and would like a work note if possible a short term disability.  Patient is in 1st trimester unable to work due to morning sickness and don't want to lose her job due to too many missed work. Please advise

## 2021-05-28 NOTE — TELEPHONE ENCOUNTER
Left message #1 at [phone] to call clinic to schedule appt with Dr. Daniels to fill out [facility] paperwork. Will try again tomorrow if appt has not been made.

## 2021-05-28 NOTE — PROGRESS NOTES
Subjective:    Shelia is here today to follow-up on her nausea and vomiting of pregnancy.    The patient was last seen on 4/11/2019.  At that time, she had nausea and vomiting.  Insurance was not covering doxylamine and pyridoxine.  Diclegis had been sent in, but not sure if it was covered.  At that point, she thought she would just try to tough it out until the nausea went away.  Since then, the nausea has persisted.  She went out and got the doxylamine and pyridoxine, and pay cash for them.  She takes them once per day.  Has not noticed any improvement.    Having difficulty keeping food down.  The smell is too strong to make her want to eat.  When she does eat, she is sometimes able to keep food down, but sometimes it comes back up.  She is eating very small amounts (example half of one slice of bread).  She is better able to tolerate sweet foods like bananas.  Having difficulty drinking as well.  If she drinks more than a tiny bit, she vomits.  The water smells too strongly.    She feels like she is going to pass out when she stands up.  She is feeling very fatigued.    She is unable to work at this time.  She started feeling sick around 3/28/2019.  In early April, she missed 2 weeks of work.  She would really like to be off work for another couple of weeks before returning.  Her supervisor told her to get FMLA from the time that if she first got sick until the end of pregnancy, to cover absences.    OB ROS:   Headache: None.   Vision change: None.   Abnormal vaginal discharge: None.   Bleeding: None.   Fetal movement: Normal.     Objective:  Reviewed vitals. Exam - see flowsheet.   Wt Readings from Last 6 Encounters:   05/06/19 109 lb (49.4 kg)   04/19/19 110 lb (49.9 kg)   04/02/19 110 lb (49.9 kg)   03/22/19 111 lb 12 oz (50.7 kg)   01/21/19 108 lb 4 oz (49.1 kg)   12/21/16 110 lb (49.9 kg)       Recent Results (from the past 24 hour(s))   Urinalysis, OB Screen (ketones, glucose, protein)   Result Value Ref  Range    Glucose, UA Negative Negative    Ketones, UA Trace (!) Negative    Protein, UA Negative Negative mg/dL      Assessment/Plan:    29 y.o.  at 12w2d.    Hyperemesis gravidarum.  Weight down 2 pounds since prepregnancy, ketones present on 2019 and 2019.  Electrolytes pending.  Doxylamine and pyridoxine not effective.  Discontinue the doxylamine and pyridoxine and start meclizine 25 mg every 6 hours, instead.  Follow-up in 4 to 5 days to recheck symptoms.  Discussed dietary modification.  FMLA form completed.    Order Summary                                                      1. Hyperemesis gravidarum  meclizine (ANTIVERT) 25 mg tablet    folic acid (FOLVITE) 800 MCG tablet    Basic Metabolic Panel    Thyroid [place]   2. Encounter for supervision of other normal pregnancy in second trimester  Urinalysis, OB Screen (ketones, glucose, protein)   3. Constipation, unspecified constipation type  docusate sodium (COLACE) 100 MG capsule      Completed by: Wilma Daniels M.D., [facility]. 2019 2:49 PM.  Total time: 15 minutes. Greater than 50% spent in counseling and coordination of care regarding topics, above.

## 2021-05-29 NOTE — PATIENT INSTRUCTIONS - HE
"  Patient Education   HEALTHY PREGNANCY CARE: 14 to 18 WEEKS PREGNANT    During this time, you may start to \"show,\" so that you look pregnant to people around you. You may also notice some changes in your skin, such as an increase in acne on your face. You may notice your heart pounding, a sharp stretching ache on either side of your lower abdomen (round ligament), and more vaginal discharge.     Your baby's nerves and muscles are maturing. Sex organs are recognizable. Your baby is now able to pass urine, and your baby's first stool (meconium) is starting to collect in his or her intestines. Hair is also beginning to grow on your baby's head. Your baby is moving freely inside your uterus but you may not be able to feel it until 18-20 weeks.    Continue making healthy choices for your baby during pregnancy, including good nutrition, exercise and a safe environment free from smoking, alcohol and drugs.    Your genetic screening with a quad screen blood test may have been done today.    Watch for warning signs and contact your midwife or physician at the clinic with any concerns at [facility]/OB at Phone: [phone]. For example, call about cramping, bleeding, abdominal pain, watery vaginal discharge, or if you are unable to keep fluids down for more than 24 hours due to vomiting.   If it's after clinic hours, physician patients should call the [facility] at [phone] ([phone]); midwife patients should call their answering service at [phone].    How can you care for yourself at home?   You can refer to the Starting Out Right book or find it online at [email or web address] or [email or web address]-starting-[email or web address]#p=8     You can sign up for a weekly parenting e-mail that gives support, tips and advice from health care professionals that starts " with pregnancy and continues through the toddler years. To register, go to [email or web address] at any time during your pregnancy.

## 2021-05-29 NOTE — PROGRESS NOTES
Subjective:  Not 100% better.   Vomiting better - keeping down some foods (bland foods), garlic smell causes vomiting, hot pepper causes diarrhea  Eating better  Dizziness still there  Fatigue still there    OB ROS:   Headache: None.   Vision change: None.   Abnormal vaginal discharge: None.   Bleeding: None.   Fetal movement: Normal.     Objective:  Vitals:    19 1508   BP: (!) 70/42   Pulse: 80   Resp: 14   Temp: 97.1  F (36.2  C)   SpO2: 99%      Prepregnancy BMI: 21.3. Total weight gain: -1 lb (-0.454 kg)   Exam: see flowsheet.  Recent Results (from the past 24 hour(s))   Urinalysis, OB Screen (ketones, glucose, protein)   Result Value Ref Range    Glucose, UA Negative Negative    Ketones, UA Trace (!) Negative    Protein, UA Negative Negative mg/dL      Assessment/Plan:    29 y.o.  at 14w6d.    Due to prepregnancy BMI of 21.3, weight gain of -1 lb (-0.454 kg) is insufficient. The following recommendations were made: continue care of hyperemesis.     Hyperemesis gravidarum. Symptoms gradually improving. Still below pre-pregnancy weight. Doesn't want to change medications. Discussed diet and lifestyle.    Order Summary                                                      1. Encounter for supervision of other normal pregnancy in second trimester  Urinalysis, OB Screen (ketones, glucose, protein)      Completed by: Wilma Daniels M.D., [facility]. 2019 3:30 PM.  Total time: 15 minutes. Greater than 50% spent in counseling and coordination of care regarding topics, above.

## 2021-05-30 NOTE — TELEPHONE ENCOUNTER
Please let Shelia know that her ultrasound looked okay.      There was a tiny white spot on the baby's heart called an intracardiac echogenic focus.  Most of the time this is normal.  But it can be seen a little bit more often in babies who have problems like Down syndrome.      If she wants to, she could do some more testing for Down syndrome.  Her next appointment is in early August.  If she would like, she can wait until that appointment to discuss this further.  If she is worried, please help her schedule an earlier appointment.    Future Appointments   Date Time Provider Department Center   8/2/2019  3:20 PM Wilma Daniels MD [facility]

## 2021-05-30 NOTE — TELEPHONE ENCOUNTER
New Appointment Needed  What is the reason for the visit:    Follow up on results from Ultrasound   Provider Preference: PCP only  How soon do you need to be seen?: as soon as possible. If patient could be squeezed in to be seen.   Waitlist offered?: No  Okay to leave a detailed message:  Yes

## 2021-05-30 NOTE — TELEPHONE ENCOUNTER
PT IS SCHEDULE TO SEE DR. CABELLO ON 8/2/19 @ 3:20 PM FOR OB. PT WILL ARRIVE AT 3 PM TO HAVE 1HR GLUCOSE START.

## 2021-05-30 NOTE — TELEPHONE ENCOUNTER
Patient Returning Call  Reason for call:  The patient is returning the call.  Information relayed to patient:  Below message has been relayed to the patient. The patient was transferred to appointment scheduling.  Patient has additional questions:  No  If YES, what are your questions/concerns:  NA  Okay to leave a detailed message?: No call back needed

## 2021-05-30 NOTE — TELEPHONE ENCOUNTER
----- Message from Wilma Daniels MD sent at 7/8/2019  1:19 PM CDT -----  Please tell Shelia that her quad screen came back normal.  That means she is at low risk for having a baby with down syndrome.    Please let Shelia know that her ultrasound looked okay.       There was a tiny white spot on the baby's heart called an intracardiac echogenic focus.  Most of the time this is normal.  But it can be seen a little bit more often in babies who have problems like Down syndrome.       If she wants to, she could do some more testing for Down syndrome.  Her next appointment is in early August.  If she would like, she can wait until that appointment to discuss this further.  If she is worried, please help her schedule an earlier appointment.            Future Appointments   Date Time Provider Department Center   8/2/2019  3:20 PM Wilma Daniels MD [facility]             Documentation

## 2021-05-30 NOTE — TELEPHONE ENCOUNTER
Patient Returning Call  Reason for call:  Results  Information relayed to patient: Read below note to patient, patient will call back to get appointment rescheduled as would like to be seen sooner to discuss. 7/8/2019  1:19 PM CDT -----  Please tell Shelia that her quad screen came back normal.  That means she is at low risk for having a baby with down syndrome.         Please let Shelia know that her ultrasound looked okay.       There was a tiny white spot on the baby's heart called an intracardiac echogenic focus.  Most of the time this is normal.  But it can be seen a little bit more often in babies who have problems like Down syndrome.       If she wants to, she could do some more testing for Down syndrome.  Her next appointment is in early August.  If she would like, she can wait until that appointment to discuss this further.  If she is worried, please help her schedule an earlier appointment.                 Future Appointments   Date Time Provider Department Center   8/2/2019  3:20 PM Wilma Daniels MD [facility]              Documentation         Patient has additional questions:  Yes  If YES, what are your questions/concerns:  Patient will discuss during appointment.  Okay to leave a detailed message?: No call back needed

## 2021-05-30 NOTE — TELEPHONE ENCOUNTER
Called pt left a message informing pt that her scheduled appt for 8/2/19 has been moved up to 7/16/19 at 4:20pm.

## 2021-05-30 NOTE — PROGRESS NOTES
Subjective:  Nausea better. Still gets nausea with certain foods.    OB ROS:   Headache: None.   Vision change: None.   Abnormal vaginal discharge: None.   Bleeding: None.   Fetal movement: Normal.     Objective:  Vitals:    19 1545   BP: (!) 82/46   Pulse: 92   Resp: 26      Prepregnancy BMI: 21.3. Total weight gain: 7 lb (3.175 kg)   Exam: see flowsheet.  Recent Results (from the past 24 hour(s))   Urinalysis, OB Screen (ketones, glucose, protein)   Result Value Ref Range    Glucose, UA Negative Negative    Ketones, UA Negative Negative    Protein, UA Trace (!) Negative mg/dL      Assessment/Plan:    29 y.o.  at 20w3d.    Due to prepregnancy BMI of 21.3, weight gain of 7 lb (3.175 kg) is insufficient. The following recommendations were made: continue current diet and activity. Increase nutrients.    Declines SMA and CF carrier screening. Interested in quad screen + anatomic survey. These were ordered.     Discussed diabetes screening at next visit.    Order Summary                                                      1. Encounter for supervision of other normal pregnancy in second trimester  Urinalysis, OB Screen (ketones, glucose, protein)    US OB > = 14 Weeks    Maternal Serum Screen, Alpha Fetoprotein, hCG, Estriol, and Inhibin A (Quad)   2. Hyperemesis gravidarum     3. Normal pregnancy in second trimester        Completed by: Wilma Daniels M.D., [facility]. 2019 3:53 PM.  Total time: 15 minutes. Greater than 50% spent in counseling and coordination of care regarding topics, above.

## 2021-05-30 NOTE — TELEPHONE ENCOUNTER
"Called pt to reschedule appt 7/26/19 to 8/2/19 around 3:40 pm or 4 pm Per patient's request. Left message for pt to return call to clinic. Okay to use same or reserved slots for Dr. Daniels.    \"okay to relay message\"  "

## 2021-05-30 NOTE — TELEPHONE ENCOUNTER
Left message to call back #[phone]. Please relay message below to the patient upon returned call.

## 2021-05-30 NOTE — PROGRESS NOTES
Subjective:  Discussed fetal echogenic intracardiac focus.  Discussed that this is most likely normal, but it is seen with increased frequency in babies with trisomy.  The patient would like to do noninvasive prenatal testing with cell free DNA testing.  This was ordered.  She will come back for a lab visit to have it done at the lab closed before she was able to get the blood drawn.    OB ROS:   Headache: None.   Vision change: None.   Abnormal vaginal discharge: None.   Bleeding: None.   Fetal movement: Normal.     Objective:  Vitals:    19 1629   BP: (!) 88/52   Pulse: 68   Resp: 18   Temp: 97.7  F (36.5  C)      Prepregnancy BMI: 21.3. Total weight gain: 8 lb (3.629 kg)   Exam: see flowsheet.  Recent Results (from the past 24 hour(s))   Urinalysis, OB Screen (ketones, glucose, protein)   Result Value Ref Range    Glucose, UA Negative Negative    Ketones, UA Negative Negative    Protein, UA Negative Negative mg/dL      Assessment/Plan:    29 y.o.  at 22w3d.    Due to prepregnancy BMI of 21.3, weight gain of 8 lb (3.629 kg) is insufficient. The following recommendations were made: continue current diet and activity and Weight gain is improving..     Fetal intracardiac echogenic focus.  Noninvasive prenatal testing ordered with cell free DNA.    Language barrier.  A professional [employer]  was present for conversation today.    Order Summary                                                      1. Encounter for supervision of other normal pregnancy in second trimester  Urinalysis, OB Screen (ketones, glucose, protein)    prenatal vit-iron fum-folic ac (PRENATAL VITAMIN) 27 mg iron- 0.8 mg Tab tablet   2. Normal pregnancy in second trimester     3. Hyperemesis gravidarum     4. Fetal cardiac echogenic focus, single or unspecified fetus  Verifi Cell Free Fetal DNA - Misc. Lab Test      Completed by: Wilma Daniels M.D., [facility]. 2019 6:24 PM.  Total time: 15 minutes. Greater  than 50% spent in counseling and coordination of care regarding topics, above.

## 2021-05-30 NOTE — PATIENT INSTRUCTIONS - HE
"  Patient Education   HEALTHY PREGNANCY CARE: 18-22 WEEKS PREGNANT    Your baby is continuing to develop quickly. At this stage, babies can now suck their thumbs,  firmly with their hands, and are beginning to hear.    Sometime between 18 and 22 weeks, you will start to feel your baby move. At first, these small fetal movements feel like fluttering or \"butterflies.\" Some women say that they feel like gas bubbles. As the baby grows, these movements will become stronger and be able to be felt through your abdomen.     Nutrition: During this time, you may find that your nausea and fatigue are gone. Overall, you may feel better and have more energy than you did in your first trimester. Be sure you are getting enough calcium and iron in your diet. Your prenatal vitamins cannot supply all of the nutrients you need, so continue to eat 3-4 servings of dairy foods and 2-3 servings of meat/fish/poultry/nuts every day. Foods high in iron include: red meats, eggs, dark green vegetables, dark yellow vegetables, nuts, kidney beans and chickpeas. Some cereals are fortified with iron, so look at the food labels for 100% of the daily requirement for iron.     [place] for childbirth and parenting classes, including an infant CPR class. Breastfeeding classes are recommended too.    Plan for the gestational diabetes screening between weeks 24-28. You can eat normally before that visit; we would suggest making sure you have protein foods, but not a lot of carbohydrates or sugary foods.    Your blood type was determined at your first OB appointment. If you are Rh negative, you should discuss the need for a Rhogam shot with your midwife or physician.     If you had a  birth in the past, discuss a trial of labor with your midwife or physician. He or she may ask that you obtain your operative report from that  if you are wanting to have a vaginal birth after  () this time.     Think about the support you " have, and what help you can plan on from family and friends, after your baby is born. Many mothers and babies are ready to go home from the hospital within a few days. Your clinic staff is available to assist you and the [facility] staff is available to you after hours. Start preparing your other children for changes they'll experience with the new baby. Explore day care options.    You may find that you have new discomforts now, such as sleep problems or leg cramps. To access information that can help you ease these discomforts, you can refer to the Starting Out Right book or find it online at [email or web address] or [email or web address]-[email or web address]#p=8    You can sign up for a weekly parenting e-mail that gives support, tips and advice from health care professionals that starts with pregnancy and continues through the toddler years. To register, go to [email or web address] at any time during your pregnancy.    Watch for the warning signs of premature labor:     Dull, low backache    Contractions of the uterus, menstrual-like cramps    Abdominal cramping with or without diarrhea    More pelvic pressure    Increase or change in vaginal discharge.     Remember that labor doesn't have to hurt. Never hesitate to call your midwife or physician or their staff at [facility]/OB at Phone: [phone] for any one of these warning signs - or if something just doesn't feel right. If it's after clinic hours, physician patients should call the [facility] at [phone]); midwife patients should call their answering service at [phone].

## 2021-05-30 NOTE — PATIENT INSTRUCTIONS - HE
Patient Education   HEALTHY PREGNANCY CARE: 22-26 WEEKS PREGNANT    You are finishing your second trimester. Your baby is developing rapidly. At this stage, babies have a sense of balance, can respond to touch, and are recognizing parent voices.  Your baby will be moving around more now.  You may notice Tony-Gomez contractions now, which are painless and prepare the uterus for the delivery.    Nutrition: During this time, you may find that your nausea and fatigue are gone. Overall, you may feel better and have more energy than you did in your first trimester. Be sure you are getting enough calcium and iron in your diet. Your prenatal vitamins cannot supply all of the nutrients you need, so continue to eat 3-4 servings of dairy foods and 2-3 servings of meat/fish/poultry/nuts every day. Foods high in iron include: red meats, eggs, dark green vegetables, dark yellow vegetables, nuts, kidney beans and chickpeas. Some cereals are fortified with iron, so look at the food labels for 100% of the daily requirement for iron.     Discuss your work situation with your midwife or physician as needed. If you stand for long periods of time, you may need to make changes and take breaks.    [place] for childbirth and parenting classes, including an infant CPR class. Breastfeeding classes are recommended too.    Plan for the gestational diabetes screening between weeks 24-28. You can eat normally before that visit; we would suggest making sure you have protein foods, but not a lot of carbohydrates or sugary foods.    Your blood type was determined at your first OB appointment. If you are Rh negative, you should discuss the need for a Rhogam shot with your midwife or physician. This would be administered around 28 weeks if necessary.    How can you care for yourself at home?   You can refer to the Starting Out Right book or find it online at http://www.[facility].org/images/stories/maternity/[facility]-Starting-Out-Right.pdf or  "http://www.healthTaptu.org/images/stories/flipbooks/[facility]-starting-out-right/[facility]-starting-out-right.html#p=8     You can sign up for a weekly parenting e-mail that gives support, tips and advice from health care professionals that starts with pregnancy and continues through the toddler years. To register, go to [email or web address] at any time during your pregnancy.    Watch for the warning signs of premature labor:   \" Dull, low backache  \" Contractions of the uterus, menstrual-like cramps  \" Abdominal cramping with or without diarrhea  \" More pelvic pressure  \" Increase or change in vaginal discharge.     Continue to watch for signs and symptoms of preeclampsia:   \" Sudden swelling of your face, hands, or feet   \" New vision problems such as blurring, double vision, or flashing lights  \" A severe headache not relieved with acetaminophen (Tylenol)  \" Sharp or stabbing pain in your right or middle upper abdomen    Remember that labor doesn't have to hurt. Never hesitate to call your midwife or physician or their staff at [facility] FAMILY MEDICINE/OB at Phone: [phone] for any one of these warning signs - or if something just doesn't feel right. If it's after clinic hours, physician patients should call the Care Connection at [phone] ([phone]); midwife patients should call their answering service at [phone].      Baby Feeding in the Hospital: Information, Support and Resources    As you prepare for the birth of your child, you will want to consider options for feeding your baby including breast-feeding and/or baby formula. The American Academy of Pediatrics recommends exclusive breast-feeding for the first six months (although any amount of breast-feeding is beneficial).  However, we also understand that breast-feeding is a personal choice and not for everyone. Whether or not you choose to breast-feed, your decision will be respected by our staff.    There are numerous benefits of " breast-feeding; here are a few to consider:    Provides antibodies to protect your baby from infections and diseases    The cost: formula can cost over $1,500 per year    Convenience, no warming up or sterilizing bottles and supplies    The physical contact with breastfeeding can make babies feel secure, warm and comforted    What ever my feeding choice, what can I expect after I deliver my baby?    Your baby will usually be placed skin-to-skin immediately following birth. The skin to skin contact between you and your baby will be a special and memorable time. The bonding and attachment comforts your baby and has a positive effect on baby s brain development.     Having your baby  room in  with you also helps you start to learn your baby s body rhythms and sleep cycle.      You will also begin to learn your baby s cues (signals) that he or she is ready to feed.    When do I start to feed my baby?  As soon as possible after your baby s birth, you will be encouraged to begin feeding.  In the first couple of weeks, your baby will eat often.  Breastfeeding babies usually eat at least 8 times in 24 hours.  Babies fed formula usually eat at least 7 times in 24 hours.      Breast-feeding tips:    Get comfortable and use pillows for support.    Have your baby at the level of your breast, facing you,  tummy to tummy .      Touch your nipple to your baby s lips so you baby s mouth opens wide (rooting reflex).  Aim the nipple toward the roof of your baby s mouth. When your baby opens his or her mouth, pull your baby toward your breast to help your baby  latch on  to your nipple and much of the areola area.    Hand expressing your breast milk can assist with latching your baby to your breast, if needed.    Ask for help, breastfeeding may seem awkward or uncomfortable at first, this is normal. There are numerous resources available at [facility], Clinics and beyond.     If your goal is to exclusively breastfeed, avoid  using any formula or artificial nipples (including bottles and pacifiers) while you are your baby are learning to breastfeed unless there is a medical reason.       Mixing breastfeeding and formula can interfere with how you begin building your milk supply.  It can impact how you and your baby  learn  to breastfeeding together and alter the natural growth of  good  bacteria in your baby s stomach.    Delay a pacifier or a bottle in the first few weeks until breastfeeding is well established. This is often around 3 weeks of age.    Ask your nurse to show you how to hand express.   Breast milk can be kept in the refrigerator or freezer for later use.    Hospital Resources:  [facility] Lactation Clinics located at [facility], [facility] and [facility]  Call: [phone].    Inpatient support    Outpatient appointments    Telephone consultation    Breast-feeding classes available through [employer]      Online Resources:    [email or web address] sign up for free online weekly e-mail    [email or web address]/maternity    Breastfeedingmadesimple.com    Llli.org (La Leche League)    Normalfed.com    Womenshealth.gov/breastfeeding    Workandpump.com    Breast-feeding Supplies & Pumps:  Talk to your insurance provider or WIC (Women, Infants and Children) to learn more about options available to you. Recent health insurance changes may include additional coverage for supplies and pumps.    Public Health:  Women, Infants and Children Nutrition program (WIC): provides breast-feeding support and education in addition to formal feeding moms. 114-LUB-1953 or http://www.health.[facility].[place]/divs/fh/wic    Family Health Home Visiting: Public Health Nurse home visits are available. Talk to your provider to see if you qualify. Most [facility] have a program available.    Additional Resources:  La Leche League is an international, nonprofit, nonsectarian organization offering information, education,  and support to mothers who want to breast-feed their babies. Local groups offer phone help and monthly meetings. Visit [employer].org or [employer].org and us the  Find local support  drop down menu or click on the  Resources  tab.    [place] Breastfeeding Resources: 6-313-315-BABY (4684) toll free    National Breastfeeding Help Line trained breastfeeding peer counselors can help answer common breast-feeding questions by phone. Monday-Friday: English/Persian  [phone]- [phone] toll free, [phone] (TTY)    [facility] Connection: [phone] ([phone])

## 2021-05-31 NOTE — PROGRESS NOTES
Subjective:  Nausea is improving.  Now able to eat better.  Has gained 4 pounds.  Still little bit below recommended weight gain.  Discussed good nutrition.  Having insomnia.  Partly due to stress, partly due to low back pain.  Declines medications for this.    OB ROS:   Headache: None.   Vision change: None.   Abnormal vaginal discharge: None.   Bleeding: None.   Fetal movement: Normal.     Objective:  Vitals:    19 1548   BP: 90/52   Pulse: 76   Resp: 12      Prepregnancy BMI: 21.2. Total weight gain: 12 lb (5.443 kg)   Exam: see flowsheet.  Recent Results (from the past 24 hour(s))   Urinalysis, OB Screen (ketones, glucose, protein)   Result Value Ref Range    Glucose, UA Negative Negative    Ketones, UA Negative Negative    Protein, UA Negative Negative mg/dL      Assessment/Plan:    29 y.o.  at 28w6d.    Due to prepregnancy BMI of 21.2, weight gain of 12 lb (5.443 kg) is insufficient. The following recommendations were made: Increasing nutrients in diet..    Insomnia related to physical discomforts of pregnancy and distress.  Denies symptoms of depression.  Declines medications.    Order Summary                                                      1. Encounter for supervision of other normal pregnancy in third trimester  Urinalysis, OB Screen (ketones, glucose, protein)    RPR    Hemoglobin    Glucose,Gestational Challenge (1 Hour)    Tdap vaccine greater than or equal to 8yo IM      Completed by: Wilma Daniels M.D., [facility]. 2019 4:07 PM.  Total time: 15   minutes. Greater than 50% spent in counseling and coordination of care regarding topics, above.

## 2021-05-31 NOTE — PATIENT INSTRUCTIONS - HE
Patient Education   HEALTHY PREGNANCY CARE: 26-30 WEEKS PREGNANT    You are now in your last trimester of pregnancy. Your baby is growing rapidly, can open and close her eyelids, sometimes get hiccups, and you'll probably feel her moving around more often. Your baby has breathing movements and is gaining about one ounce each day. You may notice heartburn and leg cramps. Your back may ache as your body gets used to your baby's size and length.    Discuss your work situation with your midwife or physician as needed. If you stand for long periods of time, you may need to make changes and take breaks.    Pre-register online at the hospital where your baby will be born ([email or web address])     Be aware of your baby's activity level. You may be asked to do daily fetal movement counts. Contact your midwife or physician about any decreased movement.    You may have been tested for gestational diabetes today. If you are RH negative, you may have had an additional test and a Rhogam injection.    Consider receiving a Tdap vaccination to protect your baby from Pertussis/whooping cough.    If you are considering tubal ligation, discuss this with your midwife or physician. You will need to have an appointment with the obstetrician who will do the surgery to discuss the risks, benefits, and alternatives, and to sign the consent. This can be discussed at your next visit.    Continue to watch for any signs or symptoms of premature labor:     Regular contractions. This means having about 6 or more within 1 hour, even after you have had a glass of water and are resting.     A backache that starts and stops regularly.    An increase or change in vaginal discharge, such as heavy, mucus-like, watery, or bloody discharge.     Your water breaks or leaks.    Continue to watch for signs and symptoms of preeclampsia:     Sudden swelling of your face, hands, or feet     New vision  problems such as blurring, double vision, or flashing lights    A severe headache not relieved with acetaminophen (Tylenol)    Sharp or stabbing pain in your right or middle upper abdomen    If you have any of the above symptoms or any other concerns, call your midwife or physician or their clinic staff at [facility] FAMILY MEDICINE/OB at Phone: [phone]. If it's after clinic hours, physician patients should call the Care Connection at [phone] ([phone]); midwife patients should call their answering service at [phone].    How can you care for yourself at home?   You can refer to the Starting Out Right book or find it online at http://www.healths0cket.org/images/stories/maternity/[facility]-Starting-Out-Right.pdf or http://www.healths0cket.org/images/stories/flipbooks/[facility]-starting-out-right/[facility]-starting-out-right.html#p=8     You can sign up for a weekly parenting e-mail that gives support, tips and advice from health care professionals that starts with pregnancy and continues through the toddler years. To register, go to [email or web address] at any time during your pregnancy.      Baby Feeding in the Hospital: Information, Support and Resources    As you prepare for the birth of your child, you will want to consider options for feeding your baby including breast-feeding and/or baby formula. The American Academy of Pediatrics recommends exclusive breast-feeding for the first six months (although any amount of breast-feeding is beneficial).  However, we also understand that breast-feeding is a personal choice and not for everyone. Whether or not you choose to breast-feed, your decision will be respected by our staff.    There are numerous benefits of breast-feeding; here are a few to consider:    Provides antibodies to protect your baby from infections and diseases    The cost: formula can cost over $1,500 per year    Convenience, no warming up or sterilizing bottles and supplies    The  physical contact with breastfeeding can make babies feel secure, warm and comforted    What ever my feeding choice, what can I expect after I deliver my baby?    Your baby will usually be placed skin-to-skin immediately following birth. The skin to skin contact between you and your baby will be a special and memorable time. The bonding and attachment comforts your baby and has a positive effect on baby s brain development.     Having your baby  room in  with you also helps you start to learn your baby s body rhythms and sleep cycle.      You will also begin to learn your baby s cues (signals) that he or she is ready to feed.    When do I start to feed my baby?  As soon as possible after your baby s birth, you will be encouraged to begin feeding.  In the first couple of weeks, your baby will eat often.  Breastfeeding babies usually eat at least 8 times in 24 hours.  Babies fed formula usually eat at least 7 times in 24 hours.      Breast-feeding tips:    Get comfortable and use pillows for support.    Have your baby at the level of your breast, facing you,  tummy to tummy .      Touch your nipple to your baby s lips so you baby s mouth opens wide (rooting reflex).  Aim the nipple toward the roof of your baby s mouth. When your baby opens his or her mouth, pull your baby toward your breast to help your baby  latch on  to your nipple and much of the areola area.    Hand expressing your breast milk can assist with latching your baby to your breast, if needed.    Ask for help, breastfeeding may seem awkward or uncomfortable at first, this is normal. There are numerous resources available at [facility], Clinics and beyond.     If your goal is to exclusively breastfeed, avoid using any formula or artificial nipples (including bottles and pacifiers) while you are your baby are learning to breastfeed unless there is a medical reason.       Mixing breastfeeding and formula can interfere with how you begin building  your milk supply.  It can impact how you and your baby  learn  to breastfeeding together and alter the natural growth of  good  bacteria in your baby s stomach.    Delay a pacifier or a bottle in the first few weeks until breastfeeding is well established. This is often around 3 weeks of age.    Ask your nurse to show you how to hand express.   Breast milk can be kept in the refrigerator or freezer for later use.  (over)  Hospital Resources:  [facility] Lactation Clinics located at [facility], [facility] and [facility]  Call: [phone].    Inpatient support    Outpatient appointments    Telephone consultation    Breast-feeding classes available through [employer]      Online Resources:    [email or web address] sign up for free online weekly e-mail    [email or web address]/maternity    Breastfeedingmadesimple.com    Visualnest.[employer]atrip (La Leche League)    Normalfed.com    Womenshealth.gov/breastfeeding    Workandpump.com    Breast-feeding Supplies & Pumps:  Talk to your insurance provider or WIC (Women, Infants and Children) to learn more about options available to you. Recent health insurance changes may include additional coverage for supplies and pumps.    Public Health:  Women, Infants and Children Nutrition program (WIC): provides breast-feeding support and education in addition to formal feeding moms. 528-AFP-6705 or http://www.health.[facility].[place].us/divs/fh/wic    Family Health Home Visiting: Public [facility] Nurse home visits are available. Talk to your provider to see if you qualify. Most [facility] have a program available.    Additional Resources:  La Leche League is an international, nonprofit, nonsectarian organization offering information, education, and support to mothers who want to breast-feed their babies. Local groups offer phone help and monthly meetings. Visit [employer].Avatrip or [employer].org and us the  Find local support  drop down menu or click on the  Resources   tab.    [place] Breastfeeding Resources: 9-174-013-BABY (1448) toll free    National Breastfeeding Help Line trained breastfeeding peer counselors can help answer common breast-feeding questions by phone. Monday-Friday: English/Urdu  [phone]- [phone] toll free, [phone] (TTY)    [facility] Connection: [phone]-[facility] ([phone])

## 2021-06-01 NOTE — PROGRESS NOTES
Subjective:  Some cramping this week, mild.    OB ROS:   Headache: None.   Vision change: None.   Abnormal vaginal discharge: None.   Bleeding: None.   Fetal movement: Normal.     Objective:  Vitals:    19 1600   BP: 90/50   Pulse: 72   Resp: 12      Prepregnancy BMI: 21.17. Total weight gain: 16 lb (7.258 kg)   Exam: see flowsheet.  No results found for this or any previous visit (from the past 24 hour(s)).   Assessment/Plan:    29 y.o.  at 32w6d.    Due to prepregnancy BMI of 21.17, weight gain of 16 lb (7.258 kg) is insufficient. The following recommendations were made: high nutrient diet.    Discussed circumcision at length.  This is her first boy.  Undecided, will talk to her .    Flu shot given.    Order Summary                                                      1. Encounter for supervision of other normal pregnancy in third trimester  Urinalysis, OB Screen (ketones, glucose, protein)      Completed by: Wilma Daniels M.D., [facility]. 2019 4:10 PM.  Total time: 15 minutes. Greater than 50% spent in counseling and coordination of care regarding topics, above.

## 2021-06-01 NOTE — PATIENT INSTRUCTIONS - HE
Preregister: [email or web address]  Patient Education   HEALTHY PREGNANCY CARE: 30-34 WEEKS PREGNANT    You have made it to the final months of your pregnancy. By now, your baby is starting to fill out with some fat under his skin, fuzzy hair on his shoulders, and is gaining 4 to 6 ounces per week.    Discuss any travel plans with your midwife or physician.    Review possible changes in sexuality during later pregnancy and discuss these with your midwife or physician, as well as your partner. Alternative love-making positions may be more comfortable.    Discuss labor and delivery issues with your midwife or physician. If you had a  birth in the past, discuss a trial of labor with your midwife or physician. He or she may ask that you sign a consent form, if you wish to have a vaginal birth after  (). Ask your midwife or physician to explain your options for managing pain during your labor and delivery. Sometimes, during the birth process, an episiotomy may need to be cut in the vagina to make the opening bigger or let the baby come out quicker. You may want to discuss the episiotomy and how often it is needed with your midwife or physician.    Plan for your baby's care by selecting a child health care provider (Family physician, Pediatrician, or Pediatric Nurse Practitioner). Practice installing an infant car seat correctly in the car. Ask for car seat information as needed and make sure it is safe and will work in the car your baby will ride in. You will need a car seat to bring your baby home from the hospital. Check the procedure for adding your baby to your health care plan. Review your decision about circumcision and ask for any information you need. As you buy and receive items for your baby, don't put a baby walker on your list. Walkers can be dangerous and can cause serious injury to your child. A safer option is a saucer-type play station, since it doesn't allow baby to travel across the  floor.    Discuss your choices and plans for birth control with your midwife or physician. Women who are breastfeeding can still become pregnant. Use a birth control method if you want to lower your pregnancy risk. Talk to your midwife or physician if you are considering permanent birth control, such as tubal ligation or Essure. You may need to complete a consent form 30 days prior to delivery in order to have this done after you deliver.    Continue to watch for signs and symptoms of preeclampsia:     Sudden swelling of your face, hands, or feet     New vision problems such as blurring, double vision, or flashing lights    A severe headache not relieved with acetaminophen (Tylenol)    Sharp or stabbing pain in your right or middle upper abdomen    Watch for signs and symptoms of premature labor:     Regular contractions. This means having about 6 or more within 1 hour, even after you have had a glass of water and are resting.     A backache that starts and stops regularly.    An increase or change in vaginal discharge, such as heavy, mucus-like, watery, or bloody discharge.     Your water breaks or leaks.    If you have any of the above symptoms or any other concerns, call your provider or their clinic staff at [facility] FAMILY MEDICINE/OB at Phone: [phone]. If it's after clinic hours, physician patients should call the Care Connection at [phone] ([phone]); midwife patients should call their answering service at [phone].    How can you care for yourself at home?   You can refer to the Starting Out Right book or find it online at http://www.[facility].org/images/stories/maternity/[facility]-Starting-Out-Right.pdf or http://www.[facility].org/images/stories/flipbooks/[facility]-starting-out-right/[facility]-starting-out-right.html#p=8     You can sign up for a weekly parenting e-mail that gives support, tips and advice from health care professionals that starts with pregnancy and continues  through the toddler years. To register, go to [email or web address] at any time during your pregnancy.

## 2021-06-02 ENCOUNTER — RECORDS - HEALTHEAST (OUTPATIENT)
Dept: ADMINISTRATIVE | Facility: CLINIC | Age: 31
End: 2021-06-02

## 2021-06-02 VITALS — BODY MASS INDEX: 20.6 KG/M2 | WEIGHT: 109 LBS

## 2021-06-02 VITALS — BODY MASS INDEX: 20.78 KG/M2 | WEIGHT: 110 LBS

## 2021-06-02 VITALS — WEIGHT: 109 LBS | BODY MASS INDEX: 20.6 KG/M2

## 2021-06-02 VITALS — WEIGHT: 111.75 LBS | BODY MASS INDEX: 21.11 KG/M2

## 2021-06-02 VITALS — BODY MASS INDEX: 20.44 KG/M2 | HEIGHT: 61 IN | WEIGHT: 108.25 LBS

## 2021-06-02 VITALS — BODY MASS INDEX: 20.96 KG/M2 | WEIGHT: 111 LBS | HEIGHT: 61 IN

## 2021-06-02 NOTE — PATIENT INSTRUCTIONS - HE
Childbirth classes: [email or web address]  Patient Education   HEALTHY PREGNANCY CARE: 34-36 WEEKS PREGNANT    Your baby is gaining about an ounce each day, so healthy nutrition and rest are still very important. Learn about late pregnancy symptoms, such as constipation and backaches. Drinking more fluids and eating more fiber can relieve constipation. The pelvic tilt and a heating pad can ease backaches.    Continue to watch for signs and symptoms of preeclampsia:     Sudden swelling of your face, hands, or feet     New vision problems such as blurring, double vision, or flashing lights    A severe headache not relieved with acetaminophen (Tylenol)    Sharp or stabbing pain in your right or middle upper abdomen    You're almost done with your pregnancy but it's still too soon to have your baby. The goal is to have your baby after 37 weeks, so watch for signs and symptoms of premature labor:     Regular contractions. This means having about 6 or more within 1 hour, even after you have had a glass of water and are resting.     A backache that starts and stops regularly.    An increase or change in vaginal discharge, such as heavy, mucus-like, watery, or bloody discharge.     Your water breaks or leaks.    You will be tested for group B streptococcus (GBS), a type of bacteria found in 10-30% of pregnant women. A woman with GBS can pass it to her baby during delivery. Most babies who get GBS from their mothers do not have any problems, but some babies get very ill and need to be hospitalized for antibiotic treatment. Treating you with antibiotics during labor and delivery helps to prevent infection in your baby.    Review information on postpartum care that you will need after the baby is born.  Discuss your choices and plans for birth control with your midwife or physician.     Postpartum Care  During the days and weeks after the delivery of your baby (postpartum period), your body will change as it returns  "to its nonpregnant condition. As with pregnancy changes, postpartum changes are different for every woman.    Physical changes after childbirth  The changes in your body may include:    Contractions called afterpains shrink the uterus for several days after childbirth. Shrinking of the uterus to its prepregnancy size may take 6 to 8 weeks.    Sore muscles (especially in the arms, neck, or jaw) are common after childbirth. This is because of the hard work of labor and pushing your baby out. The soreness should go away in a few days.    Bleeding and vaginal discharge (lochia) may last for 2 to 8 weeks, and can come and go for about 2 months.    Vaginal soreness, including pain, discomfort, and numbness, is common after vaginal birth. Soreness may be worse if you had a perineal tear or episiotomy.    If you had a  birth (), you may have pain in your lower abdomen and may need pain medicine for 1 to 2 weeks.    Breast engorgement is common around the 3rd or 4th day after delivery, when the breasts begin to fill with milk. This can cause discomfort and swelling. If you are breast feeding, the best treatment is to feed your baby often or pump the milk out. You can also use warm compresses. If you are not breast feeding, placing ice packs on your breasts, taking a hot shower, or using warm compresses may relieve the discomfort.    Be aware of postpartum depression    \"Baby blues\" are common for the first 1 to 2 weeks after birth. You may cry or feel sad or irritable for no reason.     For some women, these feelings last longer and are more intense. This is called postpartum depression.     If your symptoms last for more than a few weeks or you feel very depressed, ask your midwife or physician for help.     Postpartum depression can be treated. Support groups and counseling can help, but sometimes medication is needed.     Discuss follow-up appointments with your midwife or physician, as well. He or she " will usually want to see you 6 weeks after the birth of your baby, sooner if you are having problems.    If you have questions about any symptoms you are experiencing or any other concerns, call your provider or their clinic staff at [facility] at Phone: [phone]. If it's after clinic hours, physician patients should call the Care Connection at [phone] ([phone]); midwife patients should call their answering service at [phone].    How can you care for yourself at home?   You can refer to the Starting Out Right book or find it online at http://www.[email or web address]/images/stories/http://www.[email or web address]/images/stories/maternity/[facility]-[email or web address]-Right.pdf or http://www.[facility].org/images/stories/flipbooks/[facility]-starting-out-right/[facility]-starting-out-right.html#p=8     You can sign up for a weekly parenting e-mail that gives support, tips and advice from health care professionals that starts with pregnancy and continues through the toddler years. To register, go to [email or web address] at any time during your pregnancy.    Making Early Breastfeeding or Chestfeeding Work: What's Important?  Breastfeeding/chestfeeding is important!     It helps keep babies healthy.    Parents who breastfeed/chestfeed have lower risks of breast and ovarian cancer.    It's convenient: the milk is always ready and warm, and there is nothing to mix or prepare for feeding.    Formula is harder for your baby to digest.    It helps you bond with your baby and protects against postpartum depression.  Lots of early skin-to-skin contact with your baby    Place your naked baby with baby's belly against your bare chest. Cover baby's back with a blanket    Start skin-to-skin right after birth, as soon as you are ready    Skin-to-skin:  ? Helps keep baby warm  ? Improves baby's oxygen and blood sugar levels  ? Helps your uterus contract and bleed less  ? Helps baby feel calm and  "comforted  ? Helps you feel close to baby  ? Helps get breastfeeding started. Being close makes latching on easier, and baby may move over to the nipple and latch without help  ? Baby breastfeeds better and longer when skin-to-skin  Placing baby well for good attachment to the breast or chest    Hold your baby close with baby's tummy touching your tummy.    Wait for baby to open mouth wide, then bring baby onto the breast/chest.    Baby should take a big mouthful of breast/chest, not just the nipple. This helps baby get more milk, and the suckling should feel comfortable.    When baby is latched well to the breast/chest, nipples aren't cracked or painful.  Keeping baby near (called \"rooming-in\" at the hospital)    Baby sleeps better and cries less when birth parent is near. Your room is quiet.    We will place your baby safely on their back in their bassinette. This lets you practice safe sleep for your baby while keeping them at your bedside.    Baby feeds more often, which means your milk supply increases faster, and your baby loses less weight.    Parents have an easier time getting to know and bonding with baby.    Parents feel much more confident about baby care and breastfeeding/chestfeeding.    Maternity staff can help at any time.  Feeding on cue    Feeding on cue simply means feeding whenever your baby shows signs of hunger    Crying is a late hunger sign. Feed baby whenever baby wants for as long as baby wants.    Feeding signs: mouth movements, sticking the tongue out, rooting (baby turns toward chest and may open mouth), hand-to-mouth movements    Advantages of feeding on cue:  ? Frequent breastfeeding/chestfeeding in the first weeks after birth gives you a good milk supply for months to come.  ? Babies settle into a relaxing feed more quickly. Babies enjoy feedings more when they don't have to cry to be fed.  ? Feeding is comfort as well as nutrition. Newborns love constant closeness and feeding and " "can't be held \"too much\" or \"spoiled.\"  ? Newborns need small frequent feedings in the first days of life. Just one to three teaspoons fill a new baby's stomach.  ? Responding to feeding cues helps babies gain weight.  Feeding only human milk in the first six months    Colostrum is the first milk that baby gets at birth. The amount of colostrum matches the baby's stomach, so it will not be overfilled.    The small volumes ready at birth are also easier for baby to handle.    All babies lose weight in the first few days. This is simply \"water weight.\"    Introducing food or fluids other than human milk too early can cause problems for breastfeeding/chestfeeding and for your baby's health.    Feeding only human milk maximizes the protection against disease and infections.    Your body knows how much milk to make by how often your baby feeds. If you give your baby formula, your body may not know how much milk to make.    For informational purposes only. Not to replace the advice of your health care provider. Adapted with permission from \"Getting Started with Infant Care and Breastfeeding,\" by TITUS Vazquez, BRYNN, Carmenza Fox, RN, IBCLC, Camille Penny MD, BRYNN, and Kassie Garza MD, IBCLC. [place] [employer], 2017. Clinically reviewed by [facility]. [employer]ouch[employer] [id number] - 05/19.        [facility]feeding Resources       Research shows that human milk offers the best  nutrition and protection for babies. So at [facility],  we care for families and babies in a way that  promotes, teaches and supports lactation. We  support all breastfeeding, chestfeeding and human  milk feeding families, as well as families who can t  breastfeed / chestfeed or who choose not to do so.  A mother or caregiver s own milk is best for a baby,  but if you are unable to breastfeed / chestfeed, we  may suggest pasteurized donor human milk for your  baby while in the hospital.    Lactation support    The " following [facility] offer  individual outpatient lactation consults. Some  locations offer phone or group lactation consults  with certified lactation consultants. Call to confirm  services and for information and appointments. You  may wish to call your insurance company first to see  if they will cover the cost of a consult.    [facility]: [phone]    [facility]: [phone]    [facility]: [phone]  Offers [place] First Days program, which includes  group lactation visits and one free information session  about delivering your baby at a designated [facility] hospital and the importance and benefits  of breastfeeding and human milk. These group visits  also help patients with feeding concerns and offer  information about other postpartum topics.                For informational purposes only. Not to replace the advice of your health care  provider. Copyright   2005 [facility]. All rights reserved. [employer] [id number] - REV .   [facility] ([place]):  [phone]    [facility] ([place]):  [phone] or [phone]    [facility]):  [phone]    [facility] Breastfeeding [facility]  ([place]): [phone]    [facility] ([place]):  [phone] or [phone]  Includes follow-up visits for caregivers of babies  discharged from the  intensive care unit  (NICU) at [facility].    [facility]):  [phone]    [facility] ([facility],  [facility], primary care clinics):  [phone]  Also offers weight checks, feeding discussions and support with a lactation consultant as a part of free, weekly support group.    [facility]: [phone]  Also offers a free weekly support group.                                                                                                                                                                                                       (continued)   You may also call [facility] at [phone]  for information about [facility] and [facility]  locations that offer lactation support. For information  about breastfeeding / chestfeeding and childbirth  classes in the [facility], go to [facility] at [email or web address].[facility].com. For [facility], go to [email or web address] and click on  Classes and Events at the bottom of the page. Or, call  [phone].    Supplies    You can get breast pumps from the Birthplace nurses  at [facility] or [facility]  nurses at [facility]. Call your health  insurance to see if they will cover the cost of the  pump. Tell your nurse you d like a pump. They will  help you fill out the right paperwork. The pump will  be ready for you when you leave the hospital.    If you decide to get a pump after you leave the  hospital, you can get one from our partners at  [facility]. [facility] carries a range of  feeding supplies and pumps. Please call your health  insurance to see if they will cover the cost of the  pump. Then call [facility]  to find out what supplies and pumps are available.  Some stores may deliver the pump to your home.    [facility]:  [email or web address] Other lactation services    [facility]evgen[facility] Vidal: [phone] (24 hours a day)  [email or web address]  Offers support for breastfeeding / chestfeeding and  human milk feeding families. Call to find a group in  your area.    [facility]:  [phone]  www.womenshealth.gov/breastfeeding  Offers a breastfeeding / chestfeeding information  line in English and German.    WIC (Women, Infants and Children) Program:  [phone]  Offers breastfeeding /  chestfeeding counseling. Call  to find an office near you.    Milk banking    [facility]  [email or web address]  [phone]  Consider freezing your extra collected milk to donate  to babies in need. Email or call for information.                           If you are deaf or hard of hearing, please let us know. We provide many free services including  sign language interpreters, oral interpreters, TTYs, telephone amplifiers, note takers and written materials.

## 2021-06-02 NOTE — PROGRESS NOTES
Subjective:  No headache or vision change.  Some itching on top of stomach.    OB ROS:   Headache: None.   Vision change: None.   Abnormal vaginal discharge: None.   Bleeding: None.   Fetal movement: Normal.     Objective:  Vitals:    10/29/19 1523   BP: 92/60   Pulse: 70   Resp: 16      Prepregnancy BMI: 21.17. Total weight gain: 21 lb (9.526 kg)   Exam: see flowsheet.  Recent Results (from the past 24 hour(s))   Urinalysis, OB Screen (ketones, glucose, protein)   Result Value Ref Range    Glucose, UA Negative Negative    Ketones, UA Negative Negative    Protein, UA Negative Negative mg/dL      Assessment/Plan:    29 y.o.  at 37w3d.    Due to prepregnancy BMI of 21.17, weight gain of 21 lb (9.526 kg) is insufficient. The following recommendations were made: continue current diet and activity.    GBS neg    Order Summary                                                      1. Encounter for supervision of other normal pregnancy in third trimester  Urinalysis, OB Screen (ketones, glucose, protein)      Completed by: Wilma Daniels M.D., [facility]. 10/29/2019 3:31 PM.  Total time: 15 minutes. Greater than 50% spent in counseling and coordination of care regarding topics, above.

## 2021-06-02 NOTE — PROGRESS NOTES
Subjective:  Back is sore. Hard to get up and out of bed.  Occasionally lightheaded when getting up.    OB ROS:   Headache: None.   Vision change: None.   Abnormal vaginal discharge: None.   Bleeding: None.   Fetal movement: Normal.     Objective:  Vitals:    10/11/19 1621   BP: 94/56   Pulse: 88   Resp: 16   Temp: 97.9  F (36.6  C)      Prepregnancy BMI: 21.34. Total weight gain: 18 lb (8.165 kg)   Exam: see flowsheet.  No results found for this or any previous visit (from the past 24 hour(s)).   Assessment/Plan:    29 y.o.  at 34w6d.    Due to prepregnancy BMI of 21.34, weight gain of 18 lb (8.165 kg) is insufficient. The following recommendations were made: high nutrient diet.    Order Summary                                                      1. Encounter for supervision of other normal pregnancy in third trimester  Urinalysis, OB Screen (ketones, glucose, protein)   2. Normal pregnancy in second trimester        Completed by: Wilma Daniels M.D., [facility]. 10/11/2019 4:53 PM.  Total time: 15 minutes. Greater than 50% spent in counseling and coordination of care regarding topics, above.

## 2021-06-02 NOTE — PATIENT INSTRUCTIONS - HE
Patient Education   HEALTHY PREGNANCY CARE: 37 to 41 WEEKS PREGNANT    Talk with your midwife or physician about when to call with signs of labor    Regular uterine contractions that are getting closer together and/or stronger    If you think your water has broken or is leaking    Bleeding from the vagina like a period (bloody vaginal discharge is normal)    If you are not feeling your baby move    Make plans for transportation and  as needed for when you are going to the hospital.    Your midwife or physician may offer to check your cervix for changes.     Ask your health care provider about vaccinations you may need following delivery. By now, you should have received a Tdap immunization to protect against pertussis or whooping cough. Fathers and family members who will be in close contact with the baby should also receive a Tdap shot at least two weeks before the expected birth of the baby if they have not had a Td (tetanus) shot for at least two years.    If you are past your due date, discuss the next steps leading to delivery with your midwife or physician. If you don't start labor on your own by 41 or 42 weeks, your midwife or physician may recommend giving you medicines to ripen your cervix and start labor.    Preparing for your baby: Tell your midwife or physician how you plan to feed your baby (breast or bottle), who you have chosen to do pediatric care for your baby, and if you have a boy, whether you have chosen to have him circumcised. You will need a car seat correctly installed in your vehicle to bring your baby home. As you start to set up the nursery at home for your baby, make sure the crib is safe. The mattress needs to fit snugly against the edges of the crib. If you can fit a soda can between the bars, they are too far apart and can allow the baby's head to caught between them.    Learn about infant care and feeding, including information about infant CPR. We recommend that you put  your baby to sleep on his or her back to reduce the chance of Sudden Infant Death Syndrome (SIDS). To maintain a healthy environment in which your child can grow, it's best to keep your home smoke-free. By preparing ahead, your transition into parenthood will go smoothly for you and your baby.    Your midwife or physician will want to see you for a checkup 2 to 6 weeks after delivery.    If you have questions about any symptoms you are experiencing or any other concerns, call your provider or their clinic staff at [facility]OB at Phone: [phone]. If it's after clinic hours, physician patients should call the [facility] at [phone] ([phone]); midwife patients should call their answering service at [phone].    How can you care for yourself at home?   You can refer to the Starting Out Right book or find it online at [email or web address] or [email or web address]#[email or web address]     You can sign up for a weekly parenting e-mail that gives support, tips and advice from health care professionals that starts with pregnancy and continues through the toddler years. To register, go to [email or web address] at any time during your pregnancy.    Making Early Breastfeeding or Chestfeeding Work: What's Important?  Breastfeeding/chestfeeding is important!     It helps keep babies healthy.    Parents who breastfeed/chestfeed have lower risks of breast and ovarian cancer.    It's convenient: the milk is always ready and warm, and there is nothing to mix or prepare for feeding.    Formula is harder for your baby to digest.    It helps you bond with your baby and protects against postpartum depression.  Lots of early skin-to-skin contact with your baby    Place your naked baby with baby's belly against your bare chest. Cover baby's back with a  "blanket    Start skin-to-skin right after birth, as soon as you are ready    Skin-to-skin:  ? Helps keep baby warm  ? Improves baby's oxygen and blood sugar levels  ? Helps your uterus contract and bleed less  ? Helps baby feel calm and comforted  ? Helps you feel close to baby  ? Helps get breastfeeding started. Being close makes latching on easier, and baby may move over to the nipple and latch without help  ? Baby breastfeeds better and longer when skin-to-skin  Placing baby well for good attachment to the breast or chest    Hold your baby close with baby's tummy touching your tummy.    Wait for baby to open mouth wide, then bring baby onto the breast/chest.    Baby should take a big mouthful of breast/chest, not just the nipple. This helps baby get more milk, and the suckling should feel comfortable.    When baby is latched well to the breast/chest, nipples aren't cracked or painful.  Keeping baby near (called \"rooming-in\" at the hospital)    Baby sleeps better and cries less when birth parent is near. Your room is quiet.    We will place your baby safely on their back in their bassinette. This lets you practice safe sleep for your baby while keeping them at your bedside.    Baby feeds more often, which means your milk supply increases faster, and your baby loses less weight.    Parents have an easier time getting to know and bonding with baby.    Parents feel much more confident about baby care and breastfeeding/chestfeeding.    Maternity staff can help at any time.  Feeding on cue    Feeding on cue simply means feeding whenever your baby shows signs of hunger    Crying is a late hunger sign. Feed baby whenever baby wants for as long as baby wants.    Feeding signs: mouth movements, sticking the tongue out, rooting (baby turns toward chest and may open mouth), hand-to-mouth movements    Advantages of feeding on cue:  ? Frequent breastfeeding/chestfeeding in the first weeks after birth gives you a good milk " "supply for months to come.  ? Babies settle into a relaxing feed more quickly. Babies enjoy feedings more when they don't have to cry to be fed.  ? Feeding is comfort as well as nutrition. Newborns love constant closeness and feeding and can't be held \"too much\" or \"spoiled.\"  ? Newborns need small frequent feedings in the first days of life. Just one to three teaspoons fill a new baby's stomach.  ? Responding to feeding cues helps babies gain weight.  Feeding only human milk in the first six months    Colostrum is the first milk that baby gets at birth. The amount of colostrum matches the baby's stomach, so it will not be overfilled.    The small volumes ready at birth are also easier for baby to handle.    All babies lose weight in the first few days. This is simply \"water weight.\"    Introducing food or fluids other than human milk too early can cause problems for breastfeeding/chestfeeding and for your baby's health.    Feeding only human milk maximizes the protection against disease and infections.    Your body knows how much milk to make by how often your baby feeds. If you give your baby formula, your body may not know how much milk to make.    For informational purposes only. Not to replace the advice of your health care provider. Adapted with permission from \"Getting Started with Infant Care and Breastfeeding,\" by TITUS Vazquez, BRYNN, Carmenza Fox, RN, IBCLC, Camille Penny MD, BRYNN, and Kassie Garza MD, IBCLC. [place] [employer], 2017. Clinically reviewed by [facility]. numberFire 617649 - 05/19.        [facility]feeding Resources     Research shows that human milk offers the best  nutrition and protection for babies. So at [facility],  we care for families and babies in a way that  promotes, teaches and supports lactation. We  support all breastfeeding, chestfeeding and human  milk feeding families, as well as families who can t  breastfeed / chestfeed or who " choose not to do so.  A mother or caregiver s own milk is best for a baby,  but if you are unable to breastfeed / chestfeed, we  may suggest pasteurized donor human milk for your  baby while in the hospital.    Lactation support    The following [facility] locations offer  individual outpatient lactation consults. Some  locations offer phone or group lactation consults  with certified lactation consultants. Call to confirm  services and for information and appointments. You  may wish to call your insurance company first to see  if they will cover the cost of a consult.    [facility]: [phone]    [facility]: [phone]    [facility]: [phone]  Offers [place] First Days program, which includes  group lactation visits and one free information session  about delivering your baby at a designated [facility] hospital and the importance and benefits  of breastfeeding and human milk. These group visits  also help patients with feeding concerns and offer  information about other postpartum topics.                For informational purposes only. Not to replace the advice of your health care provider. Copyright   2005 [facility]. All rights reserved. [employer] 504743 - REV          [facility] ([place]):  [phone]    [facility] ([place]):  [phone] or [phone]    [facility]):  [phone]    [facility]  ([place]): [phone]    [facility] ([place]):  [phone] or [phone]  Includes follow-up visits for caregivers of babies  discharged from the  intensive care unit  (NICU) at [facility].    [facility]):  [phone]    [facility] ([facility],  [facility], primary care clinics):  [phone]  Also offers  weight checks, feeding discussions and support with a lactation consultant as a part of free, weekly support group.    [facility]: [phone]  Also offers a free weekly support group.                                                                                                                                                                                                      (continued)   You may also call [facility] at [phone]  for information about [facility] and [facility]  locations that offer lactation support. For information  about breastfeeding / chestfeeding and childbirth  classes in the [facility], go to [facility] at [email or web address].[email or web address]. For [facility], go to [email or web address] and click on  Classes and Events at the bottom of the page. Or, call  [phone].    Supplies    You can get breast pumps from the Birth[facility] nurses  at [facility] or [facility]  nurses at [facility]. Call your health  insurance to see if they will cover the cost of the  pump. Tell your nurse you d like a pump. They will  help you fill out the right paperwork. The pump will  be ready for you when you leave the hospital.    If you decide to get a pump after you leave the  hospital, you can get one from our partners at  [facility]. [facility] carries a range of  feeding supplies and pumps. Please call your health  insurance to see if they will cover the cost of the  pump. Then call [facility]  to find out what supplies and pumps are available.  Some stores may deliver the pump to your home.    [facility]:  [email or web address]" Other lactation services    [facility]: [phone] (24 hours a day)  [email or web address]  Offers support for breastfeeding / chestfeeding and  human milk feeding families. Call to find a group in  your area.    National Women s Health  Information Center:  [phone]  www.womenshealth.gov/breastfeeding  Offers a breastfeeding / chestfeeding information  line in English and Welsh.    WIC (Women, Infants and Children) Program:  [phone]  Offers breastfeeding / chestfeeding counseling. Call  to find an office near you.    Milk banking    [facility]  [email or web address]  [phone]  Consider freezing your extra collected milk to donate  to babies in need. Email or call for information.                       If you are deaf or hard of hearing, please let us know. We provide many free services including  sign language interpreters, oral interpreters, TTYs, telephone amplifiers, note takers and written materials.

## 2021-06-02 NOTE — PROGRESS NOTES
Subjective:  LBP still    OB ROS:   Headache: None.   Vision change: None.   Abnormal vaginal discharge: None.   Bleeding: None.   Fetal movement: Normal.     Objective:  Vitals:    10/22/19 1637   BP: 92/58   Pulse: 94   Resp: 18   Temp: 97.9  F (36.6  C)      Prepregnancy BMI: 21.17. Total weight gain: 20 lb (9.072 kg)   Exam: see flowsheet.  Recent Results (from the past 24 hour(s))   Urinalysis, OB Screen (ketones, glucose, protein)   Result Value Ref Range    Glucose, UA Negative Negative    Ketones, UA Negative Negative    Protein, UA Negative Negative mg/dL      Assessment/Plan:    29 y.o.  at 36w3d.    Due to prepregnancy BMI of 21.17, weight gain of 20 lb (9.072 kg) is insufficient. The following recommendations were made: continue current diet and activity.    GBS obtained    Order Summary                                                      1. Encounter for supervision of other normal pregnancy in third trimester  Group B Strep Screen by PCR    Urinalysis, OB Screen (ketones, glucose, protein)      Completed by: Wilma Daniels M.D., [facility]. 10/22/2019 5:07 PM.  Total time: 15 minutes. Greater than 50% spent in counseling and coordination of care regarding topics, above.

## 2021-06-02 NOTE — PATIENT INSTRUCTIONS - HE
Patient Education   HEALTHY PREGNANCY CARE: 34-36 WEEKS PREGNANT    Your baby is gaining about an ounce each day, so healthy nutrition and rest are still very important. Learn about late pregnancy symptoms, such as constipation and backaches. Drinking more fluids and eating more fiber can relieve constipation. The pelvic tilt and a heating pad can ease backaches.    Continue to watch for signs and symptoms of preeclampsia:     Sudden swelling of your face, hands, or feet     New vision problems such as blurring, double vision, or flashing lights    A severe headache not relieved with acetaminophen (Tylenol)    Sharp or stabbing pain in your right or middle upper abdomen    You're almost done with your pregnancy but it's still too soon to have your baby. The goal is to have your baby after 37 weeks, so watch for signs and symptoms of premature labor:     Regular contractions. This means having about 6 or more within 1 hour, even after you have had a glass of water and are resting.     A backache that starts and stops regularly.    An increase or change in vaginal discharge, such as heavy, mucus-like, watery, or bloody discharge.     Your water breaks or leaks.    You will be tested for group B streptococcus (GBS), a type of bacteria found in 10-30% of pregnant women. A woman with GBS can pass it to her baby during delivery. Most babies who get GBS from their mothers do not have any problems, but some babies get very ill and need to be hospitalized for antibiotic treatment. Treating you with antibiotics during labor and delivery helps to prevent infection in your baby.    Review information on postpartum care that you will need after the baby is born.  Discuss your choices and plans for birth control with your midwife or physician.     Postpartum Care  During the days and weeks after the delivery of your baby (postpartum period), your body will change as it returns to its nonpregnant condition. As with pregnancy  "changes, postpartum changes are different for every woman.    Physical changes after childbirth  The changes in your body may include:    Contractions called afterpains shrink the uterus for several days after childbirth. Shrinking of the uterus to its prepregnancy size may take 6 to 8 weeks.    Sore muscles (especially in the arms, neck, or jaw) are common after childbirth. This is because of the hard work of labor and pushing your baby out. The soreness should go away in a few days.    Bleeding and vaginal discharge (lochia) may last for 2 to 8 weeks, and can come and go for about 2 months.    Vaginal soreness, including pain, discomfort, and numbness, is common after vaginal birth. Soreness may be worse if you had a perineal tear or episiotomy.    If you had a  birth (), you may have pain in your lower abdomen and may need pain medicine for 1 to 2 weeks.    Breast engorgement is common around the 3rd or 4th day after delivery, when the breasts begin to fill with milk. This can cause discomfort and swelling. If you are breast feeding, the best treatment is to feed your baby often or pump the milk out. You can also use warm compresses. If you are not breast feeding, placing ice packs on your breasts, taking a hot shower, or using warm compresses may relieve the discomfort.    Be aware of postpartum depression    \"Baby blues\" are common for the first 1 to 2 weeks after birth. You may cry or feel sad or irritable for no reason.     For some women, these feelings last longer and are more intense. This is called postpartum depression.     If your symptoms last for more than a few weeks or you feel very depressed, ask your midwife or physician for help.     Postpartum depression can be treated. Support groups and counseling can help, but sometimes medication is needed.     Discuss follow-up appointments with your midwife or physician, as well. He or she will usually want to see you 6 weeks after the " birth of your baby, sooner if you are having problems.    If you have questions about any symptoms you are experiencing or any other concerns, call your provider or their clinic staff at [facility] at Phone: [phone]. If it's after clinic hours, physician patients should call the [facility] at [phone] ([phone]); midwife patients should call their answering service at [phone].    How can you care for yourself at home?   You can refer to the Starting Out Right book or find it online at [email or web address]/images/stories/http://[email or web address]/images/stories/[email or web address]-[email or web address]-Right.pdf or http://www.[facility].org/images/stories/flipbooks/[facility]-starting-out-right/[facility]-starting-out-right.html#p=8     You can sign up for a weekly parenting e-mail that gives support, tips and advice from health care professionals that starts with pregnancy and continues through the toddler years. To register, go to [email or web address] at any time during your pregnancy.    Making Early Breastfeeding or Chestfeeding Work: What's Important?  Breastfeeding/chestfeeding is important!     It helps keep babies healthy.    Parents who breastfeed/chestfeed have lower risks of breast and ovarian cancer.    It's convenient: the milk is always ready and warm, and there is nothing to mix or prepare for feeding.    Formula is harder for your baby to digest.    It helps you bond with your baby and protects against postpartum depression.  Lots of early skin-to-skin contact with your baby    Place your naked baby with baby's belly against your bare chest. Cover baby's back with a blanket    Start skin-to-skin right after birth, as soon as you are ready    Skin-to-skin:  ? Helps keep baby warm  ? Improves baby's oxygen and blood sugar levels  ? Helps your uterus contract and bleed less  ? Helps baby feel calm and comforted  ? Helps you feel close to baby  ? Helps get  "breastfeeding started. Being close makes latching on easier, and baby may move over to the nipple and latch without help  ? Baby breastfeeds better and longer when skin-to-skin  Placing baby well for good attachment to the breast or chest    Hold your baby close with baby's tummy touching your tummy.    Wait for baby to open mouth wide, then bring baby onto the breast/chest.    Baby should take a big mouthful of breast/chest, not just the nipple. This helps baby get more milk, and the suckling should feel comfortable.    When baby is latched well to the breast/chest, nipples aren't cracked or painful.  Keeping baby near (called \"rooming-in\" at the hospital)    Baby sleeps better and cries less when birth parent is near. Your room is quiet.    We will place your baby safely on their back in their bassinette. This lets you practice safe sleep for your baby while keeping them at your bedside.    Baby feeds more often, which means your milk supply increases faster, and your baby loses less weight.    Parents have an easier time getting to know and bonding with baby.    Parents feel much more confident about baby care and breastfeeding/chestfeeding.    Maternity staff can help at any time.  Feeding on cue    Feeding on cue simply means feeding whenever your baby shows signs of hunger    Crying is a late hunger sign. Feed baby whenever baby wants for as long as baby wants.    Feeding signs: mouth movements, sticking the tongue out, rooting (baby turns toward chest and may open mouth), hand-to-mouth movements    Advantages of feeding on cue:  ? Frequent breastfeeding/chestfeeding in the first weeks after birth gives you a good milk supply for months to come.  ? Babies settle into a relaxing feed more quickly. Babies enjoy feedings more when they don't have to cry to be fed.  ? Feeding is comfort as well as nutrition. Newborns love constant closeness and feeding and can't be held \"too much\" or \"spoiled.\"  ? Newborns need " "small frequent feedings in the first days of life. Just one to three teaspoons fill a new baby's stomach.  ? Responding to feeding cues helps babies gain weight.  Feeding only human milk in the first six months    Colostrum is the first milk that baby gets at birth. The amount of colostrum matches the baby's stomach, so it will not be overfilled.    The small volumes ready at birth are also easier for baby to handle.    All babies lose weight in the first few days. This is simply \"water weight.\"    Introducing food or fluids other than human milk too early can cause problems for breastfeeding/chestfeeding and for your baby's health.    Feeding only human milk maximizes the protection against disease and infections.    Your body knows how much milk to make by how often your baby feeds. If you give your baby formula, your body may not know how much milk to make.    For informational purposes only. Not to replace the advice of your health care provider. Adapted with permission from \"Getting Started with Infant Care and Breastfeeding,\" by TITUS Vazquez, BRYNN, Carmenza Fox, RN, IBCLC, Camille Penny MD, BRYNN, and Kassie Garza MD, IBCLC. [place] [employer], 2017. Clinically reviewed by [facility]. [employer] 460614 - 05/19.        [facility] Breastfeeding Resources       Research shows that human milk offers the best  nutrition and protection for babies. So at [facility],  we care for families and babies in a way that  promotes, teaches and supports lactation. We  support all breastfeeding, chestfeeding and human  milk feeding families, as well as families who can t  breastfeed / chestfeed or who choose not to do so.  A mother or caregiver s own milk is best for a baby,  but if you are unable to breastfeed / chestfeed, we  may suggest pasteurized donor human milk for your  baby while in the hospital.    Lactation support    The following [facility]-affiliated locations offer  individual " outpatient lactation consults. Some  locations offer phone or group lactation consults  with certified lactation consultants. Call to confirm  services and for information and appointments. You  may wish to call your insurance company first to see  if they will cover the cost of a consult.    [facility]: [phone]    [facility]: [phone]    [facility]: [phone]  Offers [place] First Days program, which includes  group lactation visits and one free information session  about delivering your baby at a designated [facility] hospital and the importance and benefits  of breastfeeding and human milk. These group visits  also help patients with feeding concerns and offer  information about other postpartum topics.                For informational purposes only. Not to replace the advice of your health care  provider. Copyright   2005 [facility]. All rights reserved. 99Bill [id number] - REV .   [facility] ([place]):  [phone]    [facility]):  [phone] or [phone]    [facility] [place] [facility]):  [phone]    [facility]  ([place]): [phone]    [facility] ([place]):  [phone] or [phone]  Includes follow-up visits for caregivers of babies  discharged from the  intensive care unit  (NICU) at [facility] [place].    [facility] [place]):  [phone]    [facility] ([facility],  [facility] [place], primary care clinics):  [phone]  Also offers weight checks, feeding discussions and support with a lactation consultant as a part of free, weekly support group.    [facility]: [phone]  Also offers a free weekly support group.                                                                                                                                                                                                       (continued)   You may also call [facility] at [phone]  for information about [place] and [facility]  locations that offer lactation support. For information  about breastfeeding / chestfeeding and childbirth  classes in the [facility], go to [facility] at www.[email or web address]. For [facility], go to [email or web address]."[email or web address].org and click on  Classes and Events at the bottom of the page. Or, call  [phone].    Supplies    You can get breast pumps from the Birthplace nurses  at [facility] or [facility]  nurses at [facility]. Call your health  insurance to see if they will cover the cost of the  pump. Tell your nurse you d like a pump. They will  help you fill out the right paperwork. The pump will  be ready for you when you leave the hospital.    If you decide to get a pump after you leave the  hospital, you can get one from our partners at  [facility]. [facility] carries a range of  feeding supplies and pumps. Please call your health  insurance to see if they will cover the cost of the  pump. Then call [facility]  to find out what supplies and pumps are available.  Some stores may deliver the pump to your home.    [facility]:  [email or web address] Other lactation services    [facility]: [phone] (24 hours a day)  [email or web address]  Offers support for breastfeeding / chestfeeding and  human milk feeding families. Call to find a group in  your area.    National [facility] Information [facility]:  [phone]  www.womenshealth.gov/breastfeeding  Offers a breastfeeding / chestfeeding information  line in English and [place].    WIC (Women, Infants and Children) Program:  [phone]  Offers breastfeeding / chestfeeding counseling. Call  to find an office near  you.    Milk banking    [facility]  [email or web address]  [phone]  Consider freezing your extra collected milk to donate  to babies in need. Email or call for information.                           If you are deaf or hard of hearing, please let us know. We provide many free services including  sign language interpreters, oral interpreters, TTYs, telephone amplifiers, note takers and written materials.

## 2021-06-03 ENCOUNTER — RECORDS - HEALTHEAST (OUTPATIENT)
Dept: ADMINISTRATIVE | Facility: CLINIC | Age: 31
End: 2021-06-03

## 2021-06-03 VITALS — HEIGHT: 61 IN | BODY MASS INDEX: 23.41 KG/M2 | WEIGHT: 124 LBS

## 2021-06-03 VITALS
DIASTOLIC BLOOD PRESSURE: 56 MMHG | TEMPERATURE: 97.9 F | HEIGHT: 61 IN | HEART RATE: 88 BPM | BODY MASS INDEX: 24.55 KG/M2 | SYSTOLIC BLOOD PRESSURE: 94 MMHG | RESPIRATION RATE: 16 BRPM | WEIGHT: 130 LBS

## 2021-06-03 VITALS
HEART RATE: 70 BPM | SYSTOLIC BLOOD PRESSURE: 92 MMHG | RESPIRATION RATE: 16 BRPM | WEIGHT: 133 LBS | BODY MASS INDEX: 25.11 KG/M2 | HEIGHT: 61 IN | DIASTOLIC BLOOD PRESSURE: 60 MMHG

## 2021-06-03 VITALS
HEART RATE: 94 BPM | BODY MASS INDEX: 25.49 KG/M2 | RESPIRATION RATE: 16 BRPM | HEIGHT: 61 IN | SYSTOLIC BLOOD PRESSURE: 124 MMHG | DIASTOLIC BLOOD PRESSURE: 62 MMHG | TEMPERATURE: 98.3 F | WEIGHT: 135 LBS

## 2021-06-03 VITALS
DIASTOLIC BLOOD PRESSURE: 58 MMHG | BODY MASS INDEX: 24.92 KG/M2 | TEMPERATURE: 97.9 F | WEIGHT: 132 LBS | RESPIRATION RATE: 18 BRPM | HEART RATE: 94 BPM | HEIGHT: 61 IN | SYSTOLIC BLOOD PRESSURE: 92 MMHG

## 2021-06-03 VITALS
WEIGHT: 128 LBS | HEART RATE: 72 BPM | RESPIRATION RATE: 12 BRPM | DIASTOLIC BLOOD PRESSURE: 50 MMHG | BODY MASS INDEX: 24.19 KG/M2 | SYSTOLIC BLOOD PRESSURE: 90 MMHG

## 2021-06-03 VITALS
WEIGHT: 118 LBS | HEART RATE: 76 BPM | SYSTOLIC BLOOD PRESSURE: 110 MMHG | HEIGHT: 60 IN | BODY MASS INDEX: 23.16 KG/M2 | DIASTOLIC BLOOD PRESSURE: 60 MMHG | TEMPERATURE: 96.6 F | RESPIRATION RATE: 16 BRPM

## 2021-06-03 VITALS
HEART RATE: 92 BPM | BODY MASS INDEX: 25.49 KG/M2 | TEMPERATURE: 97.4 F | SYSTOLIC BLOOD PRESSURE: 90 MMHG | WEIGHT: 135 LBS | RESPIRATION RATE: 16 BRPM | DIASTOLIC BLOOD PRESSURE: 58 MMHG | HEIGHT: 61 IN

## 2021-06-03 VITALS — BODY MASS INDEX: 22.86 KG/M2 | WEIGHT: 120 LBS

## 2021-06-03 VITALS — WEIGHT: 119 LBS | BODY MASS INDEX: 22.67 KG/M2

## 2021-06-03 NOTE — PROGRESS NOTES
Subjective:  iTCHY stomach    OB ROS:   Headache: None.   Vision change: None.   Abnormal vaginal discharge: None.   Bleeding: None.   Fetal movement: Normal.     Objective:  Vitals:    19 1623   BP: 90/58   Pulse: 92   Resp: 16   Temp: 97.4  F (36.3  C)      Prepregnancy BMI: 21.17. Total weight gain: 23 lb (10.4 kg)   Exam: see flowsheet.  No results found for this or any previous visit (from the past 24 hour(s)).   Assessment/Plan:    29 y.o.  at 39w3d.    Due to prepregnancy BMI of 21.17, weight gain of 23 lb (10.4 kg) is appropriate for prepregnancy BMI. The following recommendations were made: continue current diet and activity.    Order Summary                                                      1. Encounter for supervision of other normal pregnancy in third trimester  Urinalysis, OB Screen (ketones, glucose, protein)      Completed by: Wilma Daniels M.D., [facility]. 2019 4:29 PM.  Total time: 15 minutes. Greater than 50% spent in counseling and coordination of care regarding topics, above.

## 2021-06-03 NOTE — PROGRESS NOTES
Subjective:  More contractions this week.    OB ROS:   Headache: Yes. Mild headache.   Vision change: Yes. Sees some white spots, a little bit. Started . Only occurs with position changes.   Abnormal vaginal discharge: None.   Bleeding: None.   Fetal movement: Normal.     Objective:  Vitals:    19 1630   BP: 124/62   Pulse: 94   Resp: 16   Temp: 98.3  F (36.8  C)      Prepregnancy BMI: 21.17. Total weight gain: 23 lb (10.4 kg)   Exam: see flowsheet.  No results found for this or any previous visit (from the past 24 hour(s)).   Assessment/Plan:    29 y.o.  at 38w6d.    Due to prepregnancy BMI of 21.17, weight gain of 23 lb (10.4 kg) is appropriate for prepregnancy BMI. The following recommendations were made: continue current diet and activity.    Order Summary                                                      1. Encounter for supervision of other normal pregnancy in third trimester  Urinalysis, OB Screen (ketones, glucose, protein)      Completed by: Wilma Daniels M.D., [facility]. 2019 4:42 PM.  Total time: 15 minutes. Greater than 50% spent in counseling and coordination of care regarding topics, above.

## 2021-06-03 NOTE — PATIENT INSTRUCTIONS - HE
Patient Education   HEALTHY PREGNANCY CARE: 37 to 41 WEEKS PREGNANT    Talk with your midwife or physician about when to call with signs of labor    Regular uterine contractions that are getting closer together and/or stronger    If you think your water has broken or is leaking    Bleeding from the vagina like a period (bloody vaginal discharge is normal)    If you are not feeling your baby move    Make plans for transportation and  as needed for when you are going to the hospital.    Your midwife or physician may offer to check your cervix for changes.     Ask your health care provider about vaccinations you may need following delivery. By now, you should have received a Tdap immunization to protect against pertussis or whooping cough. Fathers and family members who will be in close contact with the baby should also receive a Tdap shot at least two weeks before the expected birth of the baby if they have not had a Td (tetanus) shot for at least two years.    If you are past your due date, discuss the next steps leading to delivery with your midwife or physician. If you don't start labor on your own by 41 or 42 weeks, your midwife or physician may recommend giving you medicines to ripen your cervix and start labor.    Preparing for your baby: Tell your midwife or physician how you plan to feed your baby (breast or bottle), who you have chosen to do pediatric care for your baby, and if you have a boy, whether you have chosen to have him circumcised. You will need a car seat correctly installed in your vehicle to bring your baby home. As you start to set up the nursery at home for your baby, make sure the crib is safe. The mattress needs to fit snugly against the edges of the crib. If you can fit a soda can between the bars, they are too far apart and can allow the baby's head to caught between them.    Learn about infant care and feeding, including information about infant CPR. We recommend that you put  your baby to sleep on his or her back to reduce the chance of Sudden Infant Death Syndrome (SIDS). To maintain a healthy environment in which your child can grow, it's best to keep your home smoke-free. By preparing ahead, your transition into parenthood will go smoothly for you and your baby.    Your midwife or physician will want to see you for a checkup 2 to 6 weeks after delivery.    If you have questions about any symptoms you are experiencing or any other concerns, call your provider or their clinic staff at [facility] at Phone: [phone]. If it's after clinic hours, physician patients should call the [facility] at [phone]); midwife patients should call their answering service at [phone].    How can you care for yourself at home?   You can refer to the Starting Out Right book or find it online at [email or web address] or [email or web address]     You can sign up for a weekly parenting e-mail that gives support, tips and advice from health care professionals that starts with pregnancy and continues through the toddler years. To register, go to [email or web address] at any time during your pregnancy.    Making Early Breastfeeding or Chestfeeding Work: What's Important?  Breastfeeding/chestfeeding is important!     It helps keep babies healthy.    Parents who breastfeed/chestfeed have lower risks of breast and ovarian cancer.    It's convenient: the milk is always ready and warm, and there is nothing to mix or prepare for feeding.    Formula is harder for your baby to digest.    It helps you bond with your baby and protects against postpartum depression.  Lots of early skin-to-skin contact with your baby    Place your naked baby with baby's belly against your bare chest. Cover baby's back with a  "blanket    Start skin-to-skin right after birth, as soon as you are ready    Skin-to-skin:  ? Helps keep baby warm  ? Improves baby's oxygen and blood sugar levels  ? Helps your uterus contract and bleed less  ? Helps baby feel calm and comforted  ? Helps you feel close to baby  ? Helps get breastfeeding started. Being close makes latching on easier, and baby may move over to the nipple and latch without help  ? Baby breastfeeds better and longer when skin-to-skin  Placing baby well for good attachment to the breast or chest    Hold your baby close with baby's tummy touching your tummy.    Wait for baby to open mouth wide, then bring baby onto the breast/chest.    Baby should take a big mouthful of breast/chest, not just the nipple. This helps baby get more milk, and the suckling should feel comfortable.    When baby is latched well to the breast/chest, nipples aren't cracked or painful.  Keeping baby near (called \"rooming-in\" at the hospital)    Baby sleeps better and cries less when birth parent is near. Your room is quiet.    We will place your baby safely on their back in their bassinette. This lets you practice safe sleep for your baby while keeping them at your bedside.    Baby feeds more often, which means your milk supply increases faster, and your baby loses less weight.    Parents have an easier time getting to know and bonding with baby.    Parents feel much more confident about baby care and breastfeeding/chestfeeding.    Maternity staff can help at any time.  Feeding on cue    Feeding on cue simply means feeding whenever your baby shows signs of hunger    Crying is a late hunger sign. Feed baby whenever baby wants for as long as baby wants.    Feeding signs: mouth movements, sticking the tongue out, rooting (baby turns toward chest and may open mouth), hand-to-mouth movements    Advantages of feeding on cue:  ? Frequent breastfeeding/chestfeeding in the first weeks after birth gives you a good milk " "supply for months to come.  ? Babies settle into a relaxing feed more quickly. Babies enjoy feedings more when they don't have to cry to be fed.  ? Feeding is comfort as well as nutrition. Newborns love constant closeness and feeding and can't be held \"too much\" or \"spoiled.\"  ? Newborns need small frequent feedings in the first days of life. Just one to three teaspoons fill a new baby's stomach.  ? Responding to feeding cues helps babies gain weight.  Feeding only human milk in the first six months    Colostrum is the first milk that baby gets at birth. The amount of colostrum matches the baby's stomach, so it will not be overfilled.    The small volumes ready at birth are also easier for baby to handle.    All babies lose weight in the first few days. This is simply \"water weight.\"    Introducing food or fluids other than human milk too early can cause problems for breastfeeding/chestfeeding and for your baby's health.    Feeding only human milk maximizes the protection against disease and infections.    Your body knows how much milk to make by how often your baby feeds. If you give your baby formula, your body may not know how much milk to make.    For informational purposes only. Not to replace the advice of your health care provider. Adapted with permission from \"Getting Started with Infant Care and Breastfeeding,\" by TITUS Vazquez, BRYNN, Carmenza Fox, RN, IBCLC, Camille Penny MD, BRYNN, and Kassie Garza MD, IBCLC. [place] [employer], 2017. Clinically reviewed by [facility]. [employer] 974113 - 05/19.        [place] Breastfeeding Resources     Research shows that human milk offers the best  nutrition and protection for babies. So at [facility],  we care for families and babies in a way that  promotes, teaches and supports lactation. We  support all breastfeeding, chestfeeding and human  milk feeding families, as well as families who can t  breastfeed / chestfeed or who " choose not to do so.  A mother or caregiver s own milk is best for a baby,  but if you are unable to breastfeed / chestfeed, we  may suggest pasteurized donor human milk for your  baby while in the hospital.    Lactation support    The following [facility] locations offer  individual outpatient lactation consults. Some  locations offer phone or group lactation consults  with certified lactation consultants. Call to confirm  services and for information and appointments. You  may wish to call your insurance company first to see  if they will cover the cost of a consult.    [facility]: [phone]    [facility]: [phone]    [facility]: [phone]  Offers [place] First Days program, which includes  group lactation visits and one free information session  about delivering your baby at a designated [facility] hospital and the importance and benefits  of breastfeeding and human milk. These group visits  also help patients with feeding concerns and offer  information about other postpartum topics.                For informational purposes only. Not to replace the advice of your health care provider. Copyright   2005 [facility]. All rights reserved. [employer] 828045 - REV          [facility] ([place]):  [phone]    [facility] ([place]):  [phone] or [phone]    [facility]):  [phone]    [facility]  ([place]): [phone]    [facility] ([place]):  [phone] or [phone]  Includes follow-up visits for caregivers of babies  discharged from the  intensive care unit  (NICU) at [facility].    [facility]):  [phone]    [facility] ([facility],  [facility], primary care clinics):  [phone]  Also offers  weight checks, feeding discussions and support with a lactation consultant as a part of free, weekly support group.    [facility]: [phone]  Also offers a free weekly support group.                                                                                                                                                                                                      (continued)   You may also call [place] [facility] at [phone]  for information about [place] and [facility]  locations that offer lactation support. For information  about breastfeeding / chestfeeding and childbirth  classes in the [place] [facility], go to [facility] at www.[employer] [facility] [facility]. For [facility], go to [email or web address] and click on  Classes and Events at the bottom of the page. Or, call  [phone].    Supplies    You can get breast pumps from the [facility] nurses  at [facility] or [facility]  nurses at [facility]. Call your health  insurance to see if they will cover the cost of the  pump. Tell your nurse you d like a pump. They will  help you fill out the right paperwork. The pump will  be ready for you when you leave the hospital.    If you decide to get a pump after you leave the  hospital, you can get one from our partners at  [facility]. [facility] carries a range of  feeding supplies and pumps. Please call your health  insurance to see if they will cover the cost of the  pump. Then call [facility]  to find out what supplies and pumps are available.  Some stores may deliver the pump to your home.    [facility]:  [email or web address] Other lactation services    [facility]: [phone] (24 hours a day)  [email or web address]  Offers support for breastfeeding / chestfeeding and  human milk feeding families. Call to find a group in  your area.    National Women s Health  Information Center:  [phone]  www.womenshealth.gov/breastfeeding  Offers a breastfeeding / chestfeeding information  line in English and Korean.    WIC (Women, Infants and Children) Program:  [phone]  Offers breastfeeding / chestfeeding counseling. Call  to find an office near you.    Milk banking    [facility]  [email or web address]  [phone]  Consider freezing your extra collected milk to donate  to babies in need. Email or call for information.                       If you are deaf or hard of hearing, please let us know. We provide many free services including  sign language interpreters, oral interpreters, TTYs, telephone amplifiers, note takers and written materials.

## 2021-06-03 NOTE — PATIENT INSTRUCTIONS - HE
Patient Education   HEALTHY PREGNANCY CARE: 37 to 41 WEEKS PREGNANT    Talk with your midwife or physician about when to call with signs of labor    Regular uterine contractions that are getting closer together and/or stronger    If you think your water has broken or is leaking    Bleeding from the vagina like a period (bloody vaginal discharge is normal)    If you are not feeling your baby move    Make plans for transportation and  as needed for when you are going to the hospital.    Your midwife or physician may offer to check your cervix for changes.     Ask your health care provider about vaccinations you may need following delivery. By now, you should have received a Tdap immunization to protect against pertussis or whooping cough. Fathers and family members who will be in close contact with the baby should also receive a Tdap shot at least two weeks before the expected birth of the baby if they have not had a Td (tetanus) shot for at least two years.    If you are past your due date, discuss the next steps leading to delivery with your midwife or physician. If you don't start labor on your own by 41 or 42 weeks, your midwife or physician may recommend giving you medicines to ripen your cervix and start labor.    Preparing for your baby: Tell your midwife or physician how you plan to feed your baby (breast or bottle), who you have chosen to do pediatric care for your baby, and if you have a boy, whether you have chosen to have him circumcised. You will need a car seat correctly installed in your vehicle to bring your baby home. As you start to set up the nursery at home for your baby, make sure the crib is safe. The mattress needs to fit snugly against the edges of the crib. If you can fit a soda can between the bars, they are too far apart and can allow the baby's head to caught between them.    Learn about infant care and feeding, including information about infant CPR. We recommend that you put  your baby to sleep on his or her back to reduce the chance of Sudden Infant Death Syndrome (SIDS). To maintain a healthy environment in which your child can grow, it's best to keep your home smoke-free. By preparing ahead, your transition into parenthood will go smoothly for you and your baby.    Your midwife or physician will want to see you for a checkup 2 to 6 weeks after delivery.    If you have questions about any symptoms you are experiencing or any other concerns, call your provider or their clinic staff at [facility] at Phone: [phone]. If it's after clinic hours, physician patients should call the [facility] at [phone] ([phone]); midwife patients should call their answering service at [phone].    How can you care for yourself at home?   You can refer to the Starting Out Right book or find it online at [email or web address]/[email or web address]-[email or web address] or [email or web address]/images/[email or web address]/[email or web address]-out-[email or web address]-starting-out-[email or web address]#p=[email or web address]     You can sign up for a weekly parenting e-mail that gives support, tips and advice from health care professionals that starts with pregnancy and continues through the toddler years. To register, go to [email or web address] at any time during your pregnancy.    Making Early Breastfeeding or Chestfeeding Work: What's Important?  Breastfeeding/chestfeeding is important!     It helps keep babies healthy.    Parents who breastfeed/chestfeed have lower risks of breast and ovarian cancer.    It's convenient: the milk is always ready and warm, and there is nothing to mix or prepare for feeding.    Formula is harder for your baby to digest.    It helps you bond with your baby and protects against postpartum depression.  Lots of early skin-to-skin contact with your baby    Place your naked baby with baby's belly against your bare chest. Cover baby's back with a  "blanket    Start skin-to-skin right after birth, as soon as you are ready    Skin-to-skin:  ? Helps keep baby warm  ? Improves baby's oxygen and blood sugar levels  ? Helps your uterus contract and bleed less  ? Helps baby feel calm and comforted  ? Helps you feel close to baby  ? Helps get breastfeeding started. Being close makes latching on easier, and baby may move over to the nipple and latch without help  ? Baby breastfeeds better and longer when skin-to-skin  Placing baby well for good attachment to the breast or chest    Hold your baby close with baby's tummy touching your tummy.    Wait for baby to open mouth wide, then bring baby onto the breast/chest.    Baby should take a big mouthful of breast/chest, not just the nipple. This helps baby get more milk, and the suckling should feel comfortable.    When baby is latched well to the breast/chest, nipples aren't cracked or painful.  Keeping baby near (called \"rooming-in\" at the hospital)    Baby sleeps better and cries less when birth parent is near. Your room is quiet.    We will place your baby safely on their back in their bassinette. This lets you practice safe sleep for your baby while keeping them at your bedside.    Baby feeds more often, which means your milk supply increases faster, and your baby loses less weight.    Parents have an easier time getting to know and bonding with baby.    Parents feel much more confident about baby care and breastfeeding/chestfeeding.    Maternity staff can help at any time.  Feeding on cue    Feeding on cue simply means feeding whenever your baby shows signs of hunger    Crying is a late hunger sign. Feed baby whenever baby wants for as long as baby wants.    Feeding signs: mouth movements, sticking the tongue out, rooting (baby turns toward chest and may open mouth), hand-to-mouth movements    Advantages of feeding on cue:  ? Frequent breastfeeding/chestfeeding in the first weeks after birth gives you a good milk " "supply for months to come.  ? Babies settle into a relaxing feed more quickly. Babies enjoy feedings more when they don't have to cry to be fed.  ? Feeding is comfort as well as nutrition. Newborns love constant closeness and feeding and can't be held \"too much\" or \"spoiled.\"  ? Newborns need small frequent feedings in the first days of life. Just one to three teaspoons fill a new baby's stomach.  ? Responding to feeding cues helps babies gain weight.  Feeding only human milk in the first six months    Colostrum is the first milk that baby gets at birth. The amount of colostrum matches the baby's stomach, so it will not be overfilled.    The small volumes ready at birth are also easier for baby to handle.    All babies lose weight in the first few days. This is simply \"water weight.\"    Introducing food or fluids other than human milk too early can cause problems for breastfeeding/chestfeeding and for your baby's health.    Feeding only human milk maximizes the protection against disease and infections.    Your body knows how much milk to make by how often your baby feeds. If you give your baby formula, your body may not know how much milk to make.    For informational purposes only. Not to replace the advice of your health care provider. Adapted with permission from \"Getting Started with Infant Care and Breastfeeding,\" by TITUS Vazquez, BRYNN, Carmenza Fox, RN, IBCLC, Camille Penny MD, BRYNN, and Kassie Garza MD, IBCLC. [place] [employer], 2017. Clinically reviewed by [facility]. [employer] 159619 - 05/19.        [facility] Breastfeeding Resources     Research shows that human milk offers the best  nutrition and protection for babies. So at [facility],  we care for families and babies in a way that  promotes, teaches and supports lactation. We  support all breastfeeding, chestfeeding and human  milk feeding families, as well as families who can t  breastfeed / chestfeed or who " choose not to do so.  A mother or caregiver s own milk is best for a baby,  but if you are unable to breastfeed / chestfeed, we  may suggest pasteurized donor human milk for your  baby while in the hospital.    Lactation support    The following [facility] locations offer  individual outpatient lactation consults. Some  locations offer phone or group lactation consults  with certified lactation consultants. Call to confirm  services and for information and appointments. You  may wish to call your insurance company first to see  if they will cover the cost of a consult.    [facility]: [phone]    [facility]: [phone]    [facility]: [phone]  Offers [facility] First Days program, which includes  group lactation visits and one free information session  about delivering your baby at a designated [facility] hospital and the importance and benefits  of breastfeeding and human milk. These group visits  also help patients with feeding concerns and offer  information about other postpartum topics.                For informational purposes only. Not to replace the advice of your health care provider. Copyright   2005 [facility]. All rights reserved. [employer] 944703 - REV          [facility] ([place]):  [phone]    [facility] ([place]):  [phone] or [phone]    [facility]):  [phone]    [facility]  ([place]): [phone]    [facility] ([place]):  [phone] or [phone]  Includes follow-up visits for caregivers of babies  discharged from the  intensive care unit  (NICU) at [facility].    [facility]):  [phone]    [facility] ([facility],  [facility], primary care clinics):  [phone]  Also offers  weight checks, feeding discussions and support with a lactation consultant as a part of free, weekly support group.    [facility]: [phone]  Also offers a free weekly support group.                                                                                                                                                                                                      (continued)   You may also call [facility] at [phone]  for information about [facility] and [facility]  locations that offer lactation support. For information  about breastfeeding / chestfeeding and childbirth  classes in the [facility], go to [facility] at www.[facility]. For [facility] [place], go to www.[employer].org and click on  Classes and Events at the bottom of the page. Or, call  [phone].    Supplies    You can get breast pumps from the [facility] nurses  at [facility]  nurses at [facility]. Call your health  insurance to see if they will cover the cost of the  pump. Tell your nurse you d like a pump. They will  help you fill out the right paperwork. The pump will  be ready for you when you leave the hospital.    If you decide to get a pump after you leave the  hospital, you can get one from our partners at  [facility]. [facility] carries a range of  feeding supplies and pumps. Please call your health  insurance to see if they will cover the cost of the  pump. Then call [facility]  to find out what supplies and pumps are available.  Some stores may deliver the pump to your home.    [facility]:  [email or web address] Other lactation services    [facility]: [phone] (24 hours a day)  www.[email or web address].org  Offers support for breastfeeding / chestfeeding and  human milk feeding families. Call to find a group in  your area.    National Women s Health  Information Center:  [phone]  www.womenshealth.gov/breastfeeding  Offers a breastfeeding / chestfeeding information  line in English and Mohawk.    WIC (Women, Infants and Children) Program:  [phone]  Offers breastfeeding / chestfeeding counseling. Call  to find an office near you.    Milk banking    [facility]  [email or web address]  [phone]  Consider freezing your extra collected milk to donate  to babies in need. Email or call for information.                       If you are deaf or hard of hearing, please let us know. We provide many free services including  sign language interpreters, oral interpreters, TTYs, telephone amplifiers, note takers and written materials.

## 2021-06-04 VITALS
SYSTOLIC BLOOD PRESSURE: 128 MMHG | BODY MASS INDEX: 22.09 KG/M2 | TEMPERATURE: 97.5 F | HEART RATE: 66 BPM | RESPIRATION RATE: 16 BRPM | DIASTOLIC BLOOD PRESSURE: 87 MMHG | WEIGHT: 117 LBS | HEIGHT: 61 IN

## 2021-06-04 NOTE — PROGRESS NOTES
"Postpartum Visit  [facility] Medicine  Date of Service: 2019    Joselin Bass is a 29 y.o. yo  who presents for a postpartum check.    Current Concerns  None    Contraception Plans  Nexplanon.    Delivery  The patient delivered at  on 19 via .   Labor was spontaneous and complicated by nothing.     The baby had the following  complications:  5 minutes of CPAP.   Her son, John Schneider, is currently exclusively formula feeding.     Pregnancy:  Active Non-Hospital Problems    Diagnosis     Normal pregnancy in second trimester     29 y.o.  Baby's Gender: boy  : Amrit Schneider, Employment: Office work  Genetic screening: declined all screening.  Shots: Flu shot:19, Tdap:19. Rubella, Varicella, and Hepatitis B immune.       Fetal cardiac echogenic focus     Innatal negative - male gender.       Hyperemesis gravidarum     Resolved       Pregnant      (normal spontaneous vaginal delivery)     Acne vulgaris     Vitamin D deficiency        Objective     Blood pressure 110/60, pulse 76, temperature 96.6  F (35.9  C), temperature source Oral, resp. rate 16, height 5' 0.25\" (1.53 m), weight 118 lb (53.5 kg), last menstrual period 2019, unknown if currently breastfeeding. Body mass index is 22.85 kg/m .  Prepregnancy weight: 112 lb (50.8 kg) (BMI: 21.17).  Wt Readings from Last 3 Encounters:   19 118 lb (53.5 kg)   19 135 lb (61.2 kg)   19 135 lb (61.2 kg)     Heart: Regular rate and rhythm, no murmurs, rubs, or gallops.  Lungs: Clear to auscultation bilaterally.  No crackles.  Abdomen: Soft, nontender, bowel sounds normal.  No significant uterine tenderness.  Genitourinary: declined.  Extremities: Nontender, no significant edema.    Postpartum Depression Screen No data recorded.  Item 10 (suicidal thoughts): Negative.  Interpretation Guide: Possible Depression = 10 or greater.       Assessment and " Plan     29 y.o.  here today for postpartum check.  1. Postpartum evaluation of pregnancy problems: none.  2. Pap smear up to date.  3. Contraception Plan: Nexplanon.  4. Postpartum evaluation of chronic health conditions: non.  5. Referrals: none.  6. Discussed ideal body weight. Body mass index is 22.85 kg/m .  7. Return to work  without restrictions after completion of routine maternity leave.  8. Next physical exam due in one year.    Order Summary                                                      1. Encounter for postpartum visit     2. Iron deficiency anemia secondary to inadequate dietary iron intake  ferrous sulfate 325 (65 FE) MG tablet      No future appointments.    Completed by: Wilma Daniels M.D., [facility] system [facility]. 2019 3:30 PM.  This transcription uses voice recognition software, which may contain typographical errors.

## 2021-06-05 NOTE — PROGRESS NOTES
Subjective:      Shelia Luke is a 30 y.o. female who presents for Nexplanon insertion.  She is postpartum.  Patient's last menstrual period was 01/09/2020.  She denies any unprotected sex within the past 2 weeks.  No current breast-feeding.  She would like Nexplanon for contraception.  She has used it in the past without problems.    Patient Active Problem List   Diagnosis     Vitamin D deficiency     Acne vulgaris       Current Outpatient Medications:      ferrous sulfate 325 (65 FE) MG tablet, Take 1 tablet (325 mg total) by mouth every other day. Take with glass of orange juice or vitamin C to improve absorption., Disp: 100 tablet, Rfl: 1    Current Facility-Administered Medications:      lidocaine 1%-EPINEPHrine 1:100,000 1 %-1:100,000 injection 1 mL (XYLOCAINE W/EPI), 1 mL, Intradermal, Once, Laila Travis PA-C     Objective:     Allergies:  Patient has no known allergies.    Vitals:  Vitals:    01/27/20 1636   BP: 128/87   Pulse: 66   Resp: 16   Temp: 97.5  F (36.4  C)     Body mass index is 22.29 kg/m .    Vital signs reviewed.  General: Patient is alert and oriented x 3, in no apparent distress    Procedure:  Left upper inner arm was adequately anesthetized with 2.5 cc of lidocaine with Epi.  Then, using sterile technique, Nexplanon was inserted and dustin was deployed without difficulty.  Nexplanon dustin was palpable subcutaneously by myself.  Insertion site was covered with a band-aid and area was wrapped with a pressure bandage.  Patient was neurovascularly intact after exam.  Proper wound aftercare was dicussed with patient.    Results for orders placed or performed in visit on 01/27/20   Pregnancy (Beta-hCG, Qual), Urine   Result Value Ref Range    Pregnancy Test, Urine Negative Negative       Assessment and Plan:   1. Nexplanon Insertion  Insertion Date: 01/27/20  3 Year Expiration Date: 01/27/23  Consent form was reviewed with patient, signed, and will be scanned in to her chart.  She knows to  use back-up birth control for the next 1 week.       This dictation uses voice recognition software, which may contain typographical errors.

## 2021-06-05 NOTE — TELEPHONE ENCOUNTER
Patient dropped off [employer] MEDICAL REQUEST FORM for Dr. Daniels to fill out. Placed the original copies in the 's slot.    When forms are completed, patient would like it:    Mail- Please mail it to the address listed on the form.-No copy is needed.      Please re-route task back to the  to shred the copied forms and complete the task. Thanks!

## 2021-06-05 NOTE — TELEPHONE ENCOUNTER
Ma,  Did you send form to [facility]? I dont usually do that for provider the MA usually takes care of paperwork .Thank you   ANATOLIY Clark

## 2021-06-05 NOTE — TELEPHONE ENCOUNTER
Form is fill and send to the mailing address pt provide as  [facility]   [facility]  [address] [address]  [place] [address]

## 2021-06-16 PROBLEM — Z30.017 NEXPLANON INSERTION: Status: ACTIVE | Noted: 2020-01-27

## 2021-06-16 PROBLEM — L70.0 ACNE VULGARIS: Status: ACTIVE | Noted: 2019-04-19

## 2021-06-16 NOTE — TELEPHONE ENCOUNTER
Telephone Encounter by Cecilia Frost at 4/16/2019  9:45 AM     Author: Cecilia Frost Service: -- Author Type: --    Filed: 4/16/2019  9:47 AM Encounter Date: 4/13/2019 Status: Signed    : Cecilia Frost       PRIOR AUTHORIZATION DENIED    Denial Rational: MEDICATION IS A PLAN EXCLUSION. PATIENT WILL HAVE TO PAY OUT OF POCKET FOR MEDICATION.         Appeal Information:

## 2021-06-17 NOTE — PATIENT INSTRUCTIONS - HE
Patient Instructions by Wilma Daniels MD at 5/6/2019  2:20 PM     Author: Wilma Daniels MD Service: -- Author Type: Physician    Filed: 5/6/2019  3:16 PM Encounter Date: 5/6/2019 Status: Addendum    : Wilma Daniels MD (Physician)    Related Notes: Original Note by Wilma Daniels MD (Physician) filed at 5/6/2019  3:04 PM       Nausea  STOP doxylamine + pyridoxine.  START meclizine. Take one pill every six hours (when awake).  Follow up in 4-5 days and again next Monday.  No work until 5/14/19.     I also recommend:  -Small, frequent meals.  -Eat bland food.  -Eat nora.  -Sip water throughout the day.  -Avoid spicy and greasy foods.  -Try sour foods (citrus, sour apple, etc).  -Naps help, if you can find time.  -Sea bands. Pressure point bracelets that treat nausea.        Patient Education   HEALTHY PREGNANCY CARE: 10-14 WEEKS PREGNANT     By weeks 10 to 14 of your pregnancy, the placenta has formed inside your uterus. It may be possible to hear your baby's heartbeat with a doppler ultrasound device. Your baby's eyes can and do move. The arms and legs can bend.    The second trimester genetic screening tests for Down's Syndrome, Trisomy 18, and neural tube defects (which are known collectively as a quad screen) are done at 15 to 22 weeks. It's your choice whether to have these tests. You and your partner can talk to your midwife or physician about birth defects tests.    Consider breastfeeding for the healthiest way to feed your baby. Ask your midwife or physician for more information.     As your center of gravity and weight changes, use good body mechanics when changing positions and lifting. For example, use a straight back and your legs for support when lifting instead of bending over. Maintain good posture to prevent straining your muscles. Now is a good time to continue or restart your exercise program. Walking 30-60 minutes daily is an excellent way to keep fit. Yoga and swimming also offer  many benefits.    The nausea and fatigue of early pregnancy have usually started to let up, so this is a good time to focus on nutrition. Consider attending a nutrition class. A healthy diet includes about 60 grams of protein each day (3-4 servings of dairy, 2-3 servings of meat/fish/poultry/nuts), 4-6 servings of whole grain foods, and 5-6 servings of fruits and vegetables. Remember to drink 6-8 glasses of water daily.    Watch for warning signs, such as     vaginal bleeding    fluid leaking from your vagina    severe abdominal pain    nausea and vomiting more than 4-5 times a day, or if you are unable to keep anything down    fever more than 100.4 degrees F.     Contact your midwife or physician at [facility] FAMILY MEDICINE/OB at Phone: [phone] if you have these or any other concerns. If it's after clinic hours, physician patients should call the Care Connection at [phone] ([phone]); midwife patients should call their answering service at [phone].    How can you care for yourself at home?   You can refer to the Starting Out Right book or find it online at http://[facility].org/images/stories/maternity/[facility]-Starting-Out-Right.pdf or http://www.[facility].org/images/stories/flipbooks/[facility]-starting-out-right/[facility]-starting-out-right.html#p=8  You can sign up for a weekly parenting e-mail that gives support, tips and advice from health care professionals that starts with pregnancy and continues through the toddler years. To register, go to [email or web address] at any time during your pregnancy.      Breastfeeding: a Healthy Option for You and Your Baby    The choice of how you will feed your baby is important.  Before your babys birth, youll want to learn about the benefits of breastfeeding.  [facility] have been designated Baby Friendly; an initiative that was created by the World Health Organization and UNICEF.  This helps give you and your baby the best start  in feeding their baby.    Why should I breastfeed my baby?  ? Babies are less likely to develop childhood obesity or diabetes  ? Babies are less likely to suffer from recurrent ear infections  ? Babies are less likely to be hospitalized for respiratory conditions  ? Breast milk is rich in nutrients and antibodies-it is easy to digest    How does it benefit me?  ? Lowers the risk for diabetes, breast and ovarian cancer and postpartum depression  ? Moms can lose baby weight more quickly  ? Cost savings - formula can cost well over $1,500 per year  ? Convenient - no bottles and nipples to sterilize, no measuring and mixing formula  ? The physical contact with breastfeeding can make babies feel secure, warm and comforted     What about formula?  While you and your baby are staying with us at [facility], we will support whatever feeding choice you make for your baby.    Some important considerations:      The American Academy of Pediatrics, the World Health Organization, and many more organizations recommend exclusive breastfeeding for 6 months and continued breastfeeding while adding other foods for the first 1-2 years.      Any amount of breastmilk has benefits to both baby and mother.    Giving formula in replacement of breastfeeding can affect mothers milk supply.  If formula is needed, hospital staff will work with you on a plan to help develop your milk supply.    Formula alters the natural growth of good bacteria in the  stomach.     Research has found that first time mothers who offer formula in the hospital have a shorter duration of breastfeeding.    How can I start to prepare?     Start by having a conversation with your medical provider.     Talk with your partner, family and friends.     Attend a prenatal class that includes breastfeeding preparation. Birth and breastfeeding classes are offered by [facility]. Visit [employer] for class information.     After your babys birth,  hospital staff and lactation consultants will help you and your baby get off to a great start with breastfeeding.    Resources:      [facility] clinic and hospital staff will support you throughout your pregnancy, delivery and beyond.  Visit [email or web address]/maternity for more details.        A free weekly pregnancy and parenting email is offered by [facility]. Emails include week by week information about your pregnancy, babys development, breastfeeding and beyond.  Sign up at [facility].[email or web address]/baby.    [facility] [phone].  Consultants are available for assessment of your breastfeeding concerns, support and education.    [employer] helps mothers worldwide to breastfeed through mother-to-mother support, encouragement, information and education. [employer] promotes breastfeeding as an important element in the healthy development of baby and mother. Monthly classes, support groups and telephone help are offered in your community. To learn more visit [facility].org.    [facility] Care Connection: [phone] ([phone])

## 2021-06-17 NOTE — PATIENT INSTRUCTIONS - HE
Patient Instructions by Wilma Daniels MD at 4/2/2019  3:20 PM     Author: Wilma Daniels MD Service: -- Author Type: Physician    Filed: 4/2/2019  3:53 PM Encounter Date: 4/2/2019 Status: Addendum    : Wilma Daniels MD (Physician)    Related Notes: Original Note by Wilma Daniels MD (Physician) filed at 4/2/2019  3:50 PM       Pregnancy + Nausea  I sent in a prescription for doxylamine and pyridoxine.   These should be taken together, three times a day, until the nausea is better.    Take folic acid pill, instead of prenatal vitamin, until the nausea is better.    I also recommend:  -Small, frequent meals.  -Eat bland food.  -Eat nora.  -Sip water throughout the day.  -Avoid spicy and greasy foods.  -Try sour foods (citrus, sour apple, etc).  -Naps help, if you can find time.  -Sea bands. Pressure point bracelets that treat nausea.        Patient Education   HEALTHY PREGNANCY CARE: 6 to 10 WEEKS PREGNANT    Pregnancy is an important time for you to take care of yourself and your baby. There is much that you can do through simple things like nutrition and exercise that will help you achieve the best outcome possible.     Learn about the changes you and your baby will experience during pregnancy. Your baby's facial features, brain, spinal cord and internal organs are developing, and baby's heart is pumping blood. Due to hormonal changes, you may notice nausea, fatigue or breast tenderness.    Common Discomforts of Early Pregnancy  Your body goes through many changes during pregnancy. Some are noticeable like increased breast size or darkening of the color of the nipple, but some changes may cause discomfort like breast tenderness, urinary frequency, fatigue or nausea. If you have questions about the duration or severity of what you are experiencing, contact your midwife or physician for guidance.     Coping with nausea/morning sickness    Sip small amounts of water, juices, or shakes. Try drinking  between meals, not with meals.     Eat 5 or 6 small meals a day. Try dry toast, crackers, or cereal when you first get up, and eat breakfast a little later.    Make nora tea (sliced nora root in hot water with honey). Sip a cup in the morning before getting up.    Avoid spicy, greasy, and fatty foods.     When you feel sick, open your windows or go for a short walk to get fresh air.     Try nausea wristbands. These help some women.     Call your midwife or physician if you cannot keep fluids down, or if vomiting persists. There are medications that can help.    Choose healthy foods and gain the recommended amount of weight for your size. If you have questions or follow a special diet, talk with your midwife or physician. You should take one prenatal vitamin daily.  If nausea is a problem, try taking only a folic acid supplement of 400-800mcg daily until the nausea passes.    Follow safe guidelines for exercise. Low impact aerobic activities are generally okay during pregnancy. If you have a regular exercise routine, you should be able to continue it during pregnancy as long as it doesn't cause pain. Talk to your midwife or physician about your activity at your prenatal visits.    You can sign up for a weekly parenting e-mail that gives support, tips and advice from health care professionals that starts with pregnancy and continues through the toddler years. To register, go to [email or web address] at any time during your pregnancy.    Things to Avoid During Pregnancy  A general principal to follow during pregnancy is to stay away from anything that is strong/bad smelling (gas, paint, fumes, etc), or known to cause problems for mom or baby    Smoking (self or others)    Alcohol     Pesticides    Caffeine     Soft cheeses    Fish with high mercury content (such as shark, swordfish, dav mackerel, or tilefish)    Some over the counter meds (ask your midwife or physician before taking)    Changing the shiva litter  box    This is also a good time to think about genetic screening tests. These are tests done during pregnancy to look for possible problems with the baby. First trimester tests for Down's Syndrome, Trisomy 13 and 18 can be done as early as 10 weeks of pregnancy. Some testing can be done as late as 22 weeks of pregnancy, depending on the test. There are other tests that look for spinal defects, cystic fibrosis, Anibal-Sachs disease. Talk with your midwife or physician about testing.    Warning Signs    Watch for warning signs, such as     vaginal bleeding    fluid leaking from your vagina    severe abdominal pain    nausea and vomiting more than 4-5 times a day, or if you are unable to keep anything down    fever more than 100.4 degrees F.     Contact your midwife or physician at [facility] FAMILY MEDICINE/OB at Phone: [phone] if you have these or any other concerns. If it's after clinic hours, physician patients should call the Care Connection at [phone]); midwife patients should call their answering service at [phone].    How can you care for yourself at home?   You can refer to the Starting Out Right book or find it online at http://www.[facility].org/images/stories/maternity/[facility]-Starting-Out-Right.pdf or http://www.[facility].org/images/stories/flipbooks/[facility]-starting-out-right/[facility]-starting-out-right.html#p=8       Breastfeeding: a Healthy Option for You and Your Baby    The choice of how you will feed your baby is important.  Before your babys birth, youll want to learn about the benefits of breastfeeding.  [facility] have been designated Baby Friendly; an initiative that was created by the World Health Organization and UNICEF.  This helps give you and your baby the best start in feeding their baby.    Why should I breastfeed my baby?  ? Babies are less likely to develop childhood obesity or diabetes  ? Babies are less likely to suffer from recurrent ear  infections  ? Babies are less likely to be hospitalized for respiratory conditions  ? Breast milk is rich in nutrients and antibodies-it is easy to digest    How does it benefit me?  ? Lowers the risk for diabetes, breast and ovarian cancer and postpartum depression  ? Moms can lose baby weight more quickly  ? Cost savings - formula can cost well over $1,500 per year  ? Convenient - no bottles and nipples to sterilize, no measuring and mixing formula  ? The physical contact with breastfeeding can make babies feel secure, warm and comforted     What about formula?  While you and your baby are staying with us at [facility], we will support whatever feeding choice you make for your baby.    Some important considerations:      The American Academy of Pediatrics, the World Health Organization, and many more organizations recommend exclusive breastfeeding for 6 months and continued breastfeeding while adding other foods for the first 1-2 years.      Any amount of breastmilk has benefits to both baby and mother.    Giving formula in replacement of breastfeeding can affect mothers milk supply.  If formula is needed, hospital staff will work with you on a plan to help develop your milk supply.    Formula alters the natural growth of good bacteria in the  stomach.     Research has found that first time mothers who offer formula in the hospital have a shorter duration of breastfeeding.    How can I start to prepare?     Start by having a conversation with your medical provider.     Talk with your partner, family and friends.     Attend a prenatal class that includes breastfeeding preparation. Birth and breastfeeding classes are offered by [facility]. Visit [employer] for class information.     After your babys birth, hospital staff and lactation consultants will help you and your baby get off to a great start with breastfeeding.    Resources:      [facility] clinic and hospital staff  will support you throughout your pregnancy, delivery and beyond.  Visit [email or web address]/maternity for more details.        A free weekly pregnancy and parenting email is offered by [facility]. Emails include week by week information about your pregnancy, babys development, breastfeeding and beyond.  Sign up at [facility].[email or web address]/baby.    [facility] Outpatient Lactation Clinic [phone].  Consultants are available for assessment of your breastfeeding concerns, support and education.    [employer] helps mothers worldwide to breastfeed through mother-to-mother support, encouragement, information and education. [facility] promotes breastfeeding as an important element in the healthy development of baby and mother. Monthly classes, support groups and telephone help are offered in your community. To learn more visit [facility].org.    [facility] Care Connection: [phone] ([phone])

## 2021-06-18 NOTE — LETTER
Letter by Wilma Daniels MD at      Author: Wilma Daniels MD Service: -- Author Type: --    Filed:  Encounter Date: 2/5/2019 Status: (Other)       Shelia Luke  [address] [place] [address]                 February 7, 2019       Dear Ms. Luke,     Your Ortho Evra patch has been approved and was sent to the pharmacy. Please speak to the  pharmacist in regards to the proper way of using this prescription. Many women use it wrong and get  pregnant for that reason.     Please call with questions or contact us using [employer].      Sincerely,        Electronically signed by Wilma Daniels MD

## 2021-06-19 NOTE — LETTER
Letter by Wilma Daniels MD at      Author: Wilma Daniels MD Service: -- Author Type: --    Filed:  Encounter Date: 7/31/2019 Status: (Other)

## 2021-06-19 NOTE — LETTER
Letter by Wilma Daniels MD at      Author: Wilma Daniels MD Service: -- Author Type: --    Filed:  Encounter Date: 4/19/2019 Status: (Other)         April 19, 2019     Patient: Shelia Luke   YOB: 1990   Date of Visit: 4/19/2019       To Whom it May Concern:    Shelia Luke was seen in my clinic on 4/19/2019. She is pregnant. Her EDC is 11/16/19.    If you have any questions or concerns, please don't hesitate to call.    Sincerely,         Electronically signed by Wilma Daniels MD

## 2021-06-19 NOTE — LETTER
Letter by Wilma Daniels MD at      Author: Wilma Daniels MD Service: -- Author Type: --    Filed:  Encounter Date: 4/2/2019 Status: (Other)         April 2, 2019     Patient: Shelia Luke   YOB: 1990   Date of Visit: 4/2/2019       To Whom it May Concern:    Shelia Luke was seen in my clinic on 4/2/2019.    Due to acute illness, please excuse her from work from 3/28/19 and 3/29/19 and 4/2/2019  through 4/7/19. She may return to work without restrictions 4/8/19.    If you have any questions or concerns, please don't hesitate to call.    Sincerely,         Electronically signed by Wilma Daniels MD

## 2021-06-19 NOTE — LETTER
Letter by Laila Travis PA-C at      Author: Laila Travis PA-C Service: -- Author Type: --    Filed:  Encounter Date: 3/22/2019 Status: (Other)         03/22/19    To Whom It May Concern:    Shelia Luke was seen today at [facility] for Pregnancy Confirmation.    She is 5w6d.    Due Date: Estimated Date of Delivery: 11/16/19     Thank you.    Lalia Travis PA-C

## 2021-06-20 NOTE — LETTER
Letter by Wilma Daniels MD at      Author: Wilma Daniels MD Service: -- Author Type: --    Filed:  Encounter Date: 12/27/2019 Status: Signed         December 27, 2019     Patient: Shelia Luke   YOB: 1990   Date of Visit: 12/27/2019       To Whom It May Concern:    Shelia Luke was seen for postpartum check on 12/27/2019.    She may return to work without restrictions, after maternity leave, effective 12/27/2019.    If you have any questions or concerns, please don't hesitate to call.    Sincerely,        Electronically signed by Wilma Daniels MD

## 2021-06-23 NOTE — TELEPHONE ENCOUNTER
----- Message from Arnulfo Schneider CMA sent at 2/5/2019  8:36 AM CST -----  Regarding: FW: birth control  Please advise.  ----- Message -----  From: Alex Garcia,   Sent: 2/5/2019   8:29 AM  To: Wilma Daniels Care Team [place]  Subject: birth control                                    Pt called and would like for PCP to send birth control patch to pharmacy.  Pt was seen few weeks ago to remove nexplanon and Laila prescribed condoms for spouse, but pt would like to get birth control patches instead.  Any questions, please call pt to advise.

## 2021-06-23 NOTE — PROGRESS NOTES
Subjective:      Shelia Luke is a 29 y.o. female who presents for evaluation of Nexplanon removal.  Nexplanon was inserted 12/21/2016.  She would like it removed as she is planning pregnancy.    Patient Active Problem List   Diagnosis     Constipation     Vitamin D Deficiency     Anemia     Menses Abnormal     Irregular Length Of Menstrual Periods     Nexplanon insertion (12/21/16)       Current Outpatient Medications:      acetaminophen (TYLENOL) 325 MG tablet, Take 325-650 mg by mouth every 6 (six) hours as needed for pain or fever., Disp: , Rfl:      pediatric multivitamin (POLYVITAMIN/IRON) Chew chewable tablet, , Disp: , Rfl:      prenatal vit-iron fum-folic ac (PRENATAL VITAMIN) 27 mg iron- 0.8 mg Tab, Take 1 tablet by mouth once daily., Disp: 100 tablet, Rfl: 3    Current Facility-Administered Medications:      etonogestrel 68 mg implant 1 each (NEXPLANON), 1 each, Subdermal, Once, Lalia Travis PA-C     lidocaine 1%-EPINEPHrine 1:100,000 1 %-1:100,000 injection 2.5 mL (XYLOCAINE W/EPI), 2.5 mL, Other, Once, Laila Travis PA-C     Objective:     Allergies:  Patient has no known allergies.    Vitals:  Vitals:    01/21/19 1626   BP: 90/58   Pulse: 88   Resp: 20     Body mass index is 20.45 kg/m .    Vital signs reviewed.  General: Patient is alert and oriented x 3, in no apparent distress    Procedure:  Left upper inner arm was adequately anesthetized with 2.5 cc of lidocaine with Epi.  Then, using sterile technique, 5 mm incision was made with an 11 blade.  Nexplanon was palpated subcutaneously and removed with a hemostat clamp.  Incision site was closed with steri-strips and wrapped with a pressure bandage.  Patient was neurovascularly intact after exam.  Proper wound aftercare was dicussed with patient.    Assessment and Plan:   1.  Nexplanon removal.  Nexplanon was removed today.  She is aware it is possible to become pregnant soon as Nexplanon is removed.    2.  Planning  pregnancy.  Prescription sent for prenatal vitamin.  She is planning pregnancy, but would maybe like to wait a few months first.  She requested prescription for condoms be sent to her pharmacy.      This dictation uses voice recognition software, which may contain typographical errors.

## 2021-06-23 NOTE — TELEPHONE ENCOUNTER
Rx sent. Please tell the patient that she needs to ask the pharmacist how to use this. Many women use it wrong and get pregnant for that reason.

## 2021-06-24 NOTE — TELEPHONE ENCOUNTER
Called and left pt a message to call clinic back. Clinic number provided. Upon returning called, please help pt get schedule with Laila Choudhary PA-C for pregnancy confirmation visit.   Devyn Schneider, [address] [address]  PH: [phone]

## 2021-06-24 NOTE — TELEPHONE ENCOUNTER
New Appointment Needed  What is the reason for the visit:    Pregnancy Confirmation Appt Needed  When was the first day of your last menstrual cycle?: 2/9  Have you done a home pregnancy test?: Yes: 3/11 Positive.    Provider Preference: PCP  How soon do you need to be seen?: as soon as possible  Waitlist offered?: No  Okay to leave a detailed message:  Yes

## 2021-06-27 ENCOUNTER — HEALTH MAINTENANCE LETTER (OUTPATIENT)
Age: 31
End: 2021-06-27

## 2021-07-14 PROBLEM — Z31.9 PROCREATIVE MANAGEMENT: Status: RESOLVED | Noted: 2019-01-21 | Resolved: 2019-03-22

## 2021-10-15 ENCOUNTER — TRANSFERRED RECORDS (OUTPATIENT)
Dept: HEALTH INFORMATION MANAGEMENT | Facility: CLINIC | Age: 31
End: 2021-10-15

## 2021-10-15 LAB
HBA1C MFR BLD: 5.5 %
HEP C HIM: NORMAL
HIV 1&2 EXT: NORMAL

## 2021-10-17 ENCOUNTER — HEALTH MAINTENANCE LETTER (OUTPATIENT)
Age: 31
End: 2021-10-17

## 2022-01-14 ENCOUNTER — TRANSFERRED RECORDS (OUTPATIENT)
Dept: HEALTH INFORMATION MANAGEMENT | Facility: CLINIC | Age: 32
End: 2022-01-14

## 2022-03-11 ENCOUNTER — TRANSFERRED RECORDS (OUTPATIENT)
Dept: HEALTH INFORMATION MANAGEMENT | Facility: CLINIC | Age: 32
End: 2022-03-11

## 2022-07-24 ENCOUNTER — HEALTH MAINTENANCE LETTER (OUTPATIENT)
Age: 32
End: 2022-07-24

## 2022-10-02 ENCOUNTER — HEALTH MAINTENANCE LETTER (OUTPATIENT)
Age: 32
End: 2022-10-02

## 2023-08-12 ENCOUNTER — HEALTH MAINTENANCE LETTER (OUTPATIENT)
Age: 33
End: 2023-08-12